# Patient Record
Sex: MALE | Race: WHITE | NOT HISPANIC OR LATINO | Employment: FULL TIME | URBAN - METROPOLITAN AREA
[De-identification: names, ages, dates, MRNs, and addresses within clinical notes are randomized per-mention and may not be internally consistent; named-entity substitution may affect disease eponyms.]

---

## 2018-10-10 DIAGNOSIS — Z00.00 ROUTINE GENERAL MEDICAL EXAMINATION AT A HEALTH CARE FACILITY: Primary | ICD-10-CM

## 2018-11-13 ENCOUNTER — HOSPITAL ENCOUNTER (OUTPATIENT)
Dept: CARDIOLOGY | Facility: CLINIC | Age: 38
Discharge: HOME | End: 2018-11-13
Payer: COMMERCIAL

## 2018-11-13 ENCOUNTER — OFFICE VISIT (OUTPATIENT)
Dept: PULMONOLOGY | Facility: CLINIC | Age: 38
End: 2018-11-13
Payer: COMMERCIAL

## 2018-11-13 ENCOUNTER — CLINICAL SUPPORT (OUTPATIENT)
Dept: INTERNAL MEDICINE | Facility: CLINIC | Age: 38
End: 2018-11-13

## 2018-11-13 ENCOUNTER — HOSPITAL ENCOUNTER (OUTPATIENT)
Dept: RADIOLOGY | Facility: HOSPITAL | Age: 38
Discharge: HOME | End: 2018-11-13
Attending: INTERNAL MEDICINE
Payer: COMMERCIAL

## 2018-11-13 ENCOUNTER — CLINICAL SUPPORT (OUTPATIENT)
Dept: INTERNAL MEDICINE | Facility: CLINIC | Age: 38
End: 2018-11-13
Payer: COMMERCIAL

## 2018-11-13 VITALS
SYSTOLIC BLOOD PRESSURE: 142 MMHG | HEIGHT: 69 IN | WEIGHT: 245 LBS | DIASTOLIC BLOOD PRESSURE: 82 MMHG | HEART RATE: 77 BPM | BODY MASS INDEX: 36.29 KG/M2

## 2018-11-13 DIAGNOSIS — Z00.00 ROUTINE GENERAL MEDICAL EXAMINATION AT A HEALTH CARE FACILITY: Primary | ICD-10-CM

## 2018-11-13 DIAGNOSIS — Z00.00 ROUTINE GENERAL MEDICAL EXAMINATION AT A HEALTH CARE FACILITY: ICD-10-CM

## 2018-11-13 DIAGNOSIS — Z00.00 ANNUAL PHYSICAL EXAM: Primary | ICD-10-CM

## 2018-11-13 LAB
ALBUMIN SERPL BCP-MCNC: 4.2 G/DL
ALP SERPL-CCNC: 59 U/L
ALT SERPL W/O P-5'-P-CCNC: 38 U/L
ANION GAP SERPL CALC-SCNC: 8 MMOL/L
AST SERPL-CCNC: 23 U/L
BILIRUB SERPL-MCNC: 0.8 MG/DL
BUN SERPL-MCNC: 17 MG/DL
CALCIUM SERPL-MCNC: 9.6 MG/DL
CHLORIDE SERPL-SCNC: 105 MMOL/L
CHOLEST SERPL-MCNC: 183 MG/DL
CHOLEST/HDLC SERPL: 2.9 {RATIO}
CO2 SERPL-SCNC: 27 MMOL/L
CREAT SERPL-MCNC: 1 MG/DL
ERYTHROCYTE [DISTWIDTH] IN BLOOD BY AUTOMATED COUNT: 11.9 %
EST. GFR  (AFRICAN AMERICAN): >60 ML/MIN/1.73 M^2
EST. GFR  (NON AFRICAN AMERICAN): >60 ML/MIN/1.73 M^2
ESTIMATED AVG GLUCOSE: 108 MG/DL
GLUCOSE SERPL-MCNC: 117 MG/DL
HBA1C MFR BLD HPLC: 5.4 %
HCT VFR BLD AUTO: 45.3 %
HDLC SERPL-MCNC: 63 MG/DL
HDLC SERPL: 34.4 %
HGB BLD-MCNC: 15.3 G/DL
LDLC SERPL CALC-MCNC: 105.4 MG/DL
MCH RBC QN AUTO: 29.8 PG
MCHC RBC AUTO-ENTMCNC: 33.8 G/DL
MCV RBC AUTO: 88 FL
NONHDLC SERPL-MCNC: 120 MG/DL
PLATELET # BLD AUTO: 249 K/UL
PMV BLD AUTO: 10.6 FL
POTASSIUM SERPL-SCNC: 4.3 MMOL/L
PROT SERPL-MCNC: 7.5 G/DL
RBC # BLD AUTO: 5.14 M/UL
SODIUM SERPL-SCNC: 140 MMOL/L
TRIGL SERPL-MCNC: 73 MG/DL
WBC # BLD AUTO: 6.34 K/UL

## 2018-11-13 PROCEDURE — 99385 PREV VISIT NEW AGE 18-39: CPT | Mod: S$GLB,,, | Performed by: INTERNAL MEDICINE

## 2018-11-13 PROCEDURE — 97802 MEDICAL NUTRITION INDIV IN: CPT | Mod: S$GLB,,, | Performed by: INTERNAL MEDICINE

## 2018-11-13 PROCEDURE — 80053 COMPREHEN METABOLIC PANEL: CPT

## 2018-11-13 PROCEDURE — 85027 COMPLETE CBC AUTOMATED: CPT

## 2018-11-13 PROCEDURE — 71046 X-RAY EXAM CHEST 2 VIEWS: CPT | Mod: TC,FY

## 2018-11-13 PROCEDURE — 80061 LIPID PANEL: CPT

## 2018-11-13 PROCEDURE — 97750 PHYSICAL PERFORMANCE TEST: CPT | Mod: S$GLB,,, | Performed by: INTERNAL MEDICINE

## 2018-11-13 PROCEDURE — 83036 HEMOGLOBIN GLYCOSYLATED A1C: CPT

## 2018-11-13 PROCEDURE — 93005 ELECTROCARDIOGRAM TRACING: CPT | Mod: S$GLB,,, | Performed by: INTERNAL MEDICINE

## 2018-11-13 PROCEDURE — 99999 PR PBB SHADOW E&M-EST. PATIENT-LVL III: CPT | Mod: PBBFAC,,, | Performed by: INTERNAL MEDICINE

## 2018-11-13 PROCEDURE — 36415 COLL VENOUS BLD VENIPUNCTURE: CPT

## 2018-11-13 PROCEDURE — 93010 ELECTROCARDIOGRAM REPORT: CPT | Mod: S$GLB,,, | Performed by: INTERNAL MEDICINE

## 2018-11-13 PROCEDURE — 71046 X-RAY EXAM CHEST 2 VIEWS: CPT | Mod: 26,,, | Performed by: RADIOLOGY

## 2018-11-13 NOTE — PROGRESS NOTES
"Nutrition Assessment  Client name:  Eriberto Palomino  :  1980  Age:  37 y.o.  Gender:  male    Client states:  Very pleasant employee of [employer] here for his initial Executive Health physical.  Has not had a physical in nearly five years.  Is  with two young children, ages 4 (son) and 5 (daughter).  Has an unremarkable PMH with the exception of pneumonia () and lower back pain.  Admits that his eating habits are poor, recalling the necessity of weight loss.  Struggles with portion control and beef intake, expressing desire to improve both.  Review of food history revealed skipped meals, increased dining out frequency, and poor food choices.  Also, does not exercise at this time although did in the past, completing body building exercises along with adhering to a strict dietary regimen.  Does not wish to pursue such stringent behaviors in the future due to probable noncompliance.  Shares that his father in law passed away this past August at age 67 due to CVD, and since then, he and his wife wish to improve their lifestyle habits.  His wife believes she "corrupted" him after getting  as they now dine out more regularly, noting that they have only cooked twice since his father in law's death.  Adds that he has also been drinking more beer than he should and prefers high calorie beers, such as Oktoberfest, craft beer, Heineken, etc.  Overall, realizes the potential health consequences of his current behaviors and so, desires to improve them in an attempt to lose weight and feel better, physically and mentally.       Anthropometrics  Height:  5' 8.5"     Weight:  247#  BMI:  37  % Body Fat:  28.73%    Clinical Signs/Symptoms  N/V/D:  None  Appetite (Good, Fair, or Poor):  Good      No past medical history on file.    No past surgical history on file.    Medications    currently has no medications in their medication list.    Vitamins, Minerals, and/or Supplements:  None     Food " Allergies or Intolerances:  NKFA     Social History    Marital status:    Employment:  [employer]    Social History     Tobacco Use    Smoking status: Former Smoker     Packs/day: 1.00     Years: 5.00     Pack years: 5.00     Types: Cigarettes     Last attempt to quit: 2004     Years since quittin.5    Smokeless tobacco: Never Used   Substance Use Topics    Alcohol use: Yes     Alcohol/week: 4.8 oz     Types: 8 Cans of beer per week        Lab Reports   Total Cholesterol:  183    Triglycerides:  73  HDL:  63  LDL:  105.4   Glucose:  117  HbA1c:  5.4%  BP:  142/82     Food History  Breakfast:  Coffee +/- 1-2 packets of instant grits or oatmeal  Mid-morning Snack:  None  Lunch:  Restaurant meal, such as a hamburger and fries and water, unsweet tea, or Diet Coke  Mid-afternoon Snack:  None  Dinner:  2 beignets + hot dog with bun + water  H.S. Snack:  None  *Fluid intake:  Water, unsweet tea, Diet Coke (2-3/week), beer (~12/week)    Exercise History:  None    Cultural/Spiritual/Personal Preferences:  None identified    Support System:  Family    State of Change:  Contemplation    Barriers to Change:  Time constraints    Diagnosis    Obesity related to excessive energy intake, improper food choices, and inadequate physical activity as evidenced by BMI:  37.    Intervention    RMR (Method:  Body [place]):  1820 kcal  Activity Factor:  1.3  ROWENA:  2366 - 500 = 1866 kcal    Goals:  1.  Achieve 10% (24#) weight loss by 2019 (Goal weight:  223#)  2.  Begin exercising weekly as directed by EP  3.  Begin tracking intake via My Fitness Pal david, aiming for 1850 kcal daily  4.  Reduce intake of beef, replacing with fresh fish, preferably 2x/week  5.  Incorporate a ½ plate of non-starchy vegetables with lunch and dinner  6.  Incorporate 1 fruit daily  7.  Reduce dining out frequency  8.  Reduce alcohol intake, incorporating water in between each drink    Nutrition Education  Reviewed CMP, lipid  panel, and HbA1c, noting borderline high fasting glucose, however accompanied by optimal HbA1c.  Explained such to patient, stressing the importance of improved lifestyle behaviors and a healthy body weight.  Discussed the benefits of routine self-monitoring, advising patient to consider tracking intake via My Fitness Pal, aiming for 1850 kcal daily based on REE.  Supported patient's goal in reduced beef intake and increased fish intake, encouraging fish 2x/week per AHA.  Reviewed the benefits of adequate fiber intake, particularly in relation to weight loss, advising inclusion of added non-starchy vegetables and fruit daily.  Conversely, discussed the potential health consequences of frequent dining out and excessive alcohol intake, encouraging simultaneous reduction.  Provided patient with tips for alcohol reduction, advising selection of lite beer and increased water intake.  In addition, discussed weight loss, including short and long term weight loss goals, benefits of 5-10% weight loss, and ways to achieve via lifestyle modifications in lieu of fad diets.  Overall, although patient expresses knowledge regarding the necessity and benefits of lifestyle modifications, appears in the Contemplation Phase of Change.     Patient verbalized understanding of nutrition education and recommendations received.    Handouts Provided  Meal Planning Guide  Restaurant Guide  Eat Fit Shopping List  Eat Fit [place]  Fast Food Guide  Vitamin/Mineral Guide  Individual Nutrition Notes    Monitoring/Evaluation    Monitor the following:  Weight  BMI  % Body Fat  Caloric intake  Glucose    Follow Up Plan:  Communication with referring healthcare provider is unnecessary at this time as patient presented as part of annual wellness exam.  However, will follow up with patient in 1-2 years.

## 2018-11-13 NOTE — LETTER
November 13, 2018    Eriberto Palomino  [address] [address]             [facility]  [address]  [place] [address]  Phone: [phone] Dear  Eriberto,       Thank you for allowing me to serve you and perform your Executive Health exam on 11/13/2018. This letter will serve as a brief summary of the physical findings and laboratory/studies performed and recommendations at this time.  Except for a mild elevation of the blood glucose, this is a normal exam. This will resolve with a 10 to 15 pound weight loss.         If you have any questions or concerns, please don't hesitate to call.    Sincerely,        Fortino Song MD

## 2018-11-13 NOTE — PROGRESS NOTES
Subjective:       Patient ID: Eriberto Palomino is a 37 y.o. male.    Chief Complaint: No chief complaint on file.    HPI   Pt. Has no significant cardiovascular or pulmonary history.    Physical Limitations: None    Current exercise routine:  Patient does not follow any formal exercise or flexibility routine at the current time.    Goals: Pt. Set a year goal weight of 210 lbs and a long term goal weight of 195 lbs.  We discussed that he does not need to get under a BMI of 25 as long as he is maintaining lean mass and decreasing fat mass.  We calculated his long term goal of 195 lbs to get him under a BMI of 30.    Fun Facts: Pt. Was very friendly and engaged.  He works at a [employer].  Pt. Stated that his father in law recently passed away from heart disease and had told him and his wife before passing that they need to get into shape and take care of themselves and the health of their 3 children.  Pt. Seemed motivated to make a change.  Pt. Was receptive to all recommendations made.      Review of Systems    Objective:     The fitness evaluation results are as follows:  D.O.S. 11/13/2018   Height (in): 68.5   Weight (lbs): 247   BMI: 37.399164   Body Fat (%): 28.73   Waist (cm): 109   Hip (cm): 122   WHR: 0.89   RBP (mmHg): 116/84   RHR (bpm): 74    Strength R (lbs)t: 135    Strength Lt (lbs): 146.20657   Push-up Assessment: 45   Curl-up Assessment: 0   Flexibility Testing (cm): 29   REE (kcals): 1820       Physical Exam    Assessment:     Age/gender stratified assessment:  Resting BP: Within Normal Limits   Body Fat %: poor   WHR Risk Factor: low risk    Strength R: average    Strength L: above average   Upper Body Endurance: excellent   Abdominal Endurance: well below average   Lower body Flexibiltiy: good       1. Routine general medical examination at a health care facility        Plan:       Recommended fitness guidelines:    -150 minutes of moderate intensity aerobic exercise per week  or 75 minutes of vigorous intensity aerobic exercise per week.  Try to reach a minimum of 150 minutes of moderate intensity aerobic activity per week and 10,000 steps per day.     -2 to 4 days per week of resistance training for each muscle group.  Start to incorporate the upper and lower body resistance training program along with the core stabilization program given during the evaluation.      -Daily stretching with a hold of at least 30 seconds per muscle group.  Practice the seated hamstring and piriformis stretches, demonstrated during he evaluation, daily.

## 2018-11-13 NOTE — PROGRESS NOTES
Subjective:       Patient ID: Eriberto Palomino is a 37 y.o. male.    Chief Complaint: Annual Exam    HPI 38 yo employee of [employer], runs the [employer]. I saw him as a patient in 2014 for possible pneumonia. No medical contacts since that visit. He takes no medications and has never been hospitalized.   Review of Systems   Constitutional: Negative.         Obese: BMI:36   HENT: Negative.    Eyes: Negative.    Respiratory: Negative.         Possible mild case of viral pneumonia in the left lung in 2014   Cardiovascular: Negative.    Gastrointestinal: Negative.    Genitourinary: Negative.    Musculoskeletal: Negative.    Skin: Negative.    Neurological: Negative.    Psychiatric/Behavioral: Negative.    All other systems reviewed and are negative.      Objective:      Physical Exam   Constitutional: He is oriented to person, place, and time. He appears well-developed and well-nourished.   HENT:   Head: Normocephalic and atraumatic.   Right Ear: External ear normal.   Left Ear: External ear normal.   Eyes: Conjunctivae and EOM are normal. Pupils are equal, round, and reactive to light.   Neck: Normal range of motion. Neck supple.   Cardiovascular: Normal rate, regular rhythm and normal heart sounds.   Pulmonary/Chest: Effort normal and breath sounds normal.   Peak flow 650 l/min   Abdominal: Soft. Bowel sounds are normal.   Musculoskeletal: Normal range of motion.   Neurological: He is alert and oriented to person, place, and time. He has normal reflexes.   Skin: Skin is warm and dry.   Psychiatric: He has a normal mood and affect. His behavior is normal. Judgment and thought content normal.       Assessment:       1. Annual physical exam        Plan:           Labs: Glucose: 117,  improve with diet modification and weight loss. Chest x-ray is clear and EKG is normal. IMP: Healthy but overweight male.

## 2019-02-19 ENCOUNTER — OFFICE VISIT (OUTPATIENT)
Dept: URGENT CARE | Facility: CLINIC | Age: 39
End: 2019-02-19
Payer: COMMERCIAL

## 2019-02-19 VITALS
RESPIRATION RATE: 18 BRPM | DIASTOLIC BLOOD PRESSURE: 88 MMHG | TEMPERATURE: 100 F | BODY MASS INDEX: 34.36 KG/M2 | SYSTOLIC BLOOD PRESSURE: 129 MMHG | HEART RATE: 82 BPM | WEIGHT: 240 LBS | OXYGEN SATURATION: 99 % | HEIGHT: 70 IN

## 2019-02-19 DIAGNOSIS — Z20.828 EXPOSURE TO THE FLU: ICD-10-CM

## 2019-02-19 DIAGNOSIS — J06.9 UPPER RESPIRATORY TRACT INFECTION, UNSPECIFIED TYPE: Primary | ICD-10-CM

## 2019-02-19 LAB
CTP QC/QA: YES
FLUAV AG NPH QL: NEGATIVE
FLUBV AG NPH QL: NEGATIVE

## 2019-02-19 PROCEDURE — 99203 PR OFFICE/OUTPT VISIT, NEW, LEVL III, 30-44 MIN: ICD-10-PCS | Mod: S$GLB,,, | Performed by: PHYSICIAN ASSISTANT

## 2019-02-19 PROCEDURE — 3008F PR BODY MASS INDEX (BMI) DOCUMENTED: ICD-10-PCS | Mod: CPTII,S$GLB,, | Performed by: PHYSICIAN ASSISTANT

## 2019-02-19 PROCEDURE — 99203 OFFICE O/P NEW LOW 30 MIN: CPT | Mod: S$GLB,,, | Performed by: PHYSICIAN ASSISTANT

## 2019-02-19 PROCEDURE — 87804 POCT INFLUENZA A/B: ICD-10-PCS | Mod: 59,QW,S$GLB, | Performed by: PHYSICIAN ASSISTANT

## 2019-02-19 PROCEDURE — 3008F BODY MASS INDEX DOCD: CPT | Mod: CPTII,S$GLB,, | Performed by: PHYSICIAN ASSISTANT

## 2019-02-19 PROCEDURE — 87804 INFLUENZA ASSAY W/OPTIC: CPT | Mod: QW,S$GLB,, | Performed by: PHYSICIAN ASSISTANT

## 2019-02-19 NOTE — PROGRESS NOTES
"Subjective:       Patient ID: Eriberto Palomino is a 38 y.o. male.    Vitals:  height is 5' 9.5" (1.765 m) and weight is 108.9 kg (240 lb). His oral temperature is 100.3 °F (37.9 °C). His blood pressure is 129/88 and his pulse is 82. His respiration is 18 and oxygen saturation is 99%.     Chief Complaint: URI    This is a 38 y.o. male who presents today with a chief complaint of sinus congestion, scratchy throat, subjective fever. Taking Dayquil. Wife diagnosed with Viral Upper Respiratory Infection recently.      URI    This is a new problem. The current episode started in the past 7 days. The problem has been gradually worsening. Associated symptoms include congestion, coughing, diarrhea, sinus pain and a sore throat. Pertinent negatives include no chest pain, ear pain, headaches, nausea, neck pain, plugged ear sensation, rhinorrhea, sneezing, vomiting or wheezing. He has tried acetaminophen for the symptoms. The treatment provided significant relief.       Constitution: Positive for fatigue and fever.   HENT: Positive for congestion, sinus pain and sore throat. Negative for ear pain.    Neck: Negative for neck pain.   Cardiovascular: Negative for chest pain.   Respiratory: Positive for cough and sputum production. Negative for wheezing and asthma.    Gastrointestinal: Positive for diarrhea. Negative for nausea and vomiting.   Musculoskeletal: Negative for pain.   Skin: Negative for erythema.   Allergic/Immunologic: Negative for seasonal allergies, asthma, sneezing and flu shot.   Neurological: Negative for headaches and altered mental status.   Psychiatric/Behavioral: Negative for altered mental status.       Objective:      Physical Exam   Constitutional: He is oriented to person, place, and time. He appears well-developed and well-nourished. No distress.   HENT:   Head: Normocephalic and atraumatic.   Right Ear: Hearing, tympanic membrane, external ear and ear canal normal.   Left Ear: Hearing, tympanic membrane, " external ear and ear canal normal.   Nose: Mucosal edema and rhinorrhea present. Right sinus exhibits no maxillary sinus tenderness and no frontal sinus tenderness. Left sinus exhibits no maxillary sinus tenderness and no frontal sinus tenderness.   Mouth/Throat: Uvula is midline and oropharynx is clear and moist.   Eyes: Conjunctivae are normal.   Neck: Normal range of motion. Neck supple.   Cardiovascular: Normal rate and regular rhythm. Exam reveals no gallop and no friction rub.   No murmur heard.  Pulmonary/Chest: Effort normal and breath sounds normal. He has no wheezes. He has no rales.   Musculoskeletal: Normal range of motion.   Neurological: He is alert and oriented to person, place, and time.   Skin: Skin is warm and dry. No rash noted. No erythema.   Psychiatric: He has a normal mood and affect. His behavior is normal. Judgment and thought content normal.   Nursing note and vitals reviewed.      Results for orders placed or performed in visit on 02/19/19   POCT Influenza A/B   Result Value Ref Range    Rapid Influenza A Ag Negative Negative    Rapid Influenza B Ag Negative Negative     Acceptable Yes      Assessment:       1. Upper respiratory tract infection, unspecified type    2. Exposure to the flu        Plan:         Upper respiratory tract infection, unspecified type    Exposure to the flu  -     POCT Influenza A/B        Eriberto was seen today for uri.    Diagnoses and all orders for this visit:    Upper respiratory tract infection, unspecified type    Exposure to the flu  -     POCT Influenza A/B      Patient Instructions   - Rest.    - Drink plenty of fluids.    - Tylenol or Ibuprofen as directed as needed for fever/pain.    - Follow up with your PCP or specialty clinic as directed in the next 1-2 weeks if not improved or as needed.  You can call [phone] to schedule an appointment with the appropriate provider.    - Go to the ED if your symptoms worsen.  - You must  understand that you have received an Urgent Care treatment only and that you may be released before all of your medical problems are known or treated.   - You, the patient, will arrange for follow up care as instructed.   - If your condition worsens or fails to improve we recommend that you receive another evaluation at the ER immediately or contact your PCP to discuss your concerns or return here.       Viral Upper Respiratory Illness (Adult)  You have a viral upper respiratory illness (URI), which is another term for the common cold. This illness is contagious during the first few days. It is spread through the air by coughing and sneezing. It may also be spread by direct contact (touching the sick person and then touching your own eyes, nose, or mouth). Frequent handwashing will decrease risk of spread. Most viral illnesses go away within 7 to 10 days with rest and simple home remedies. Sometimes the illness may last for several weeks. Antibiotics will not kill a virus, and they are generally not prescribed for this condition.    Home care  · If symptoms are severe, rest at home for the first 2 to 3 days. When you resume activity, don't let yourself get too tired.  · Avoid being exposed to cigarette smoke (yours or others).  · You may use acetaminophen or ibuprofen to control pain and fever, unless another medicine was prescribed. (Note: If you have chronic liver or kidney disease, have ever had a stomach ulcer or gastrointestinal bleeding, or are taking blood-thinning medicines, talk with your healthcare provider before using these medicines.) Aspirin should never be given to anyone under 18 years of age who is ill with a viral infection or fever. It may cause severe liver or brain damage.  · Your appetite may be poor, so a light diet is fine. Avoid dehydration by drinking 6 to 8 glasses of fluids per day (water, soft drinks, juices, tea, or soup). Extra fluids will help loosen secretions in the nose and  lungs.  · Over-the-counter cold medicines will not shorten the length of time youre sick, but they may be helpful for the following symptoms: cough, sore throat, and nasal and sinus congestion. (Note: Do not use decongestants if you have high blood pressure.)  Follow-up care  Follow up with your healthcare provider, or as advised.  When to seek medical advice  Call your healthcare provider right away if any of these occur:  · Cough with lots of colored sputum (mucus)  · Severe headache; face, neck, or ear pain  · Difficulty swallowing due to throat pain  · Fever of 100.4°F (38°C)  Call 911, or get immediate medical care  Call emergency services right away if any of these occur:  · Chest pain, shortness of breath, wheezing, or difficulty breathing  · Coughing up blood  · Inability to swallow due to throat pain  Date Last Reviewed: 9/13/2015  © 6572-8618 [employer]. [address] [place] [address], [place] [address]. All rights reserved. This information is not intended as a substitute for professional medical care. Always follow your healthcare professional's instructions.

## 2019-02-20 NOTE — PATIENT INSTRUCTIONS
- Rest.    - Drink plenty of fluids.    - Tylenol or Ibuprofen as directed as needed for fever/pain.    - Follow up with your PCP or specialty clinic as directed in the next 1-2 weeks if not improved or as needed.  You can call [phone] to schedule an appointment with the appropriate provider.    - Go to the ED if your symptoms worsen.  - You must understand that you have received an Urgent Care treatment only and that you may be released before all of your medical problems are known or treated.   - You, the patient, will arrange for follow up care as instructed.   - If your condition worsens or fails to improve we recommend that you receive another evaluation at the ER immediately or contact your PCP to discuss your concerns or return here.       Viral Upper Respiratory Illness (Adult)  You have a viral upper respiratory illness (URI), which is another term for the common cold. This illness is contagious during the first few days. It is spread through the air by coughing and sneezing. It may also be spread by direct contact (touching the sick person and then touching your own eyes, nose, or mouth). Frequent handwashing will decrease risk of spread. Most viral illnesses go away within 7 to 10 days with rest and simple home remedies. Sometimes the illness may last for several weeks. Antibiotics will not kill a virus, and they are generally not prescribed for this condition.    Home care  · If symptoms are severe, rest at home for the first 2 to 3 days. When you resume activity, don't let yourself get too tired.  · Avoid being exposed to cigarette smoke (yours or others).  · You may use acetaminophen or ibuprofen to control pain and fever, unless another medicine was prescribed. (Note: If you have chronic liver or kidney disease, have ever had a stomach ulcer or gastrointestinal bleeding, or are taking blood-thinning medicines, talk with your healthcare provider before using these medicines.) Aspirin should  never be given to anyone under 18 years of age who is ill with a viral infection or fever. It may cause severe liver or brain damage.  · Your appetite may be poor, so a light diet is fine. Avoid dehydration by drinking 6 to 8 glasses of fluids per day (water, soft drinks, juices, tea, or soup). Extra fluids will help loosen secretions in the nose and lungs.  · Over-the-counter cold medicines will not shorten the length of time youre sick, but they may be helpful for the following symptoms: cough, sore throat, and nasal and sinus congestion. (Note: Do not use decongestants if you have high blood pressure.)  Follow-up care  Follow up with your healthcare provider, or as advised.  When to seek medical advice  Call your healthcare provider right away if any of these occur:  · Cough with lots of colored sputum (mucus)  · Severe headache; face, neck, or ear pain  · Difficulty swallowing due to throat pain  · Fever of 100.4°F (38°C)  Call 911, or get immediate medical care  Call emergency services right away if any of these occur:  · Chest pain, shortness of breath, wheezing, or difficulty breathing  · Coughing up blood  · Inability to swallow due to throat pain  Date Last Reviewed: 9/13/2015  © 6145-5608 [employer]. [address] [place] [address], [place] [address]. All rights reserved. This information is not intended as a substitute for professional medical care. Always follow your healthcare professional's instructions.

## 2019-02-22 ENCOUNTER — OFFICE VISIT (OUTPATIENT)
Dept: FAMILY MEDICINE | Facility: CLINIC | Age: 39
End: 2019-02-22
Payer: COMMERCIAL

## 2019-02-22 VITALS
HEIGHT: 70 IN | SYSTOLIC BLOOD PRESSURE: 136 MMHG | HEART RATE: 83 BPM | OXYGEN SATURATION: 97 % | DIASTOLIC BLOOD PRESSURE: 87 MMHG | RESPIRATION RATE: 16 BRPM | TEMPERATURE: 99 F | WEIGHT: 240.5 LBS | BODY MASS INDEX: 34.43 KG/M2

## 2019-02-22 DIAGNOSIS — R09.82 PND (POST-NASAL DRIP): ICD-10-CM

## 2019-02-22 DIAGNOSIS — J06.9 UPPER RESPIRATORY TRACT INFECTION, UNSPECIFIED TYPE: Primary | ICD-10-CM

## 2019-02-22 DIAGNOSIS — Z23 NEED FOR INFLUENZA VACCINATION: ICD-10-CM

## 2019-02-22 PROCEDURE — 90686 FLU VACCINE (QUAD) GREATER THAN OR EQUAL TO 3YO PRESERVATIVE FREE IM: ICD-10-PCS | Mod: S$GLB,,, | Performed by: FAMILY MEDICINE

## 2019-02-22 PROCEDURE — 99999 PR PBB SHADOW E&M-EST. PATIENT-LVL III: ICD-10-PCS | Mod: PBBFAC,,, | Performed by: FAMILY MEDICINE

## 2019-02-22 PROCEDURE — 99202 OFFICE O/P NEW SF 15 MIN: CPT | Mod: 25,S$GLB,, | Performed by: FAMILY MEDICINE

## 2019-02-22 PROCEDURE — 99202 PR OFFICE/OUTPT VISIT, NEW, LEVL II, 15-29 MIN: ICD-10-PCS | Mod: 25,S$GLB,, | Performed by: FAMILY MEDICINE

## 2019-02-22 PROCEDURE — 90686 IIV4 VACC NO PRSV 0.5 ML IM: CPT | Mod: S$GLB,,, | Performed by: FAMILY MEDICINE

## 2019-02-22 PROCEDURE — 90471 FLU VACCINE (QUAD) GREATER THAN OR EQUAL TO 3YO PRESERVATIVE FREE IM: ICD-10-PCS | Mod: S$GLB,,, | Performed by: FAMILY MEDICINE

## 2019-02-22 PROCEDURE — 99999 PR PBB SHADOW E&M-EST. PATIENT-LVL III: CPT | Mod: PBBFAC,,, | Performed by: FAMILY MEDICINE

## 2019-02-22 PROCEDURE — 3008F PR BODY MASS INDEX (BMI) DOCUMENTED: ICD-10-PCS | Mod: CPTII,S$GLB,, | Performed by: FAMILY MEDICINE

## 2019-02-22 PROCEDURE — 3008F BODY MASS INDEX DOCD: CPT | Mod: CPTII,S$GLB,, | Performed by: FAMILY MEDICINE

## 2019-02-22 PROCEDURE — 90471 IMMUNIZATION ADMIN: CPT | Mod: S$GLB,,, | Performed by: FAMILY MEDICINE

## 2019-02-22 NOTE — LETTER
February 22, 2019      [facility]  [address]  [place] [address]  Phone: [phone]  Fax: [phone]       Patient: Eriberto Palomino   YOB: 1980  Date of Visit: 02/22/2019    To Whom It May Concern:    Vasile Palomino  was at [facility] on 02/22/2019. He may return to work/school on 02/25/2019 with no restrictions. If you have any questions or concerns, or if I can be of further assistance, please do not hesitate to contact me.    Sincerely,    oLlly Dewey LPN

## 2019-02-22 NOTE — PROGRESS NOTES
Office Visit    Patient Name: Eriberto Palomino    : 1980  MRN: [id number]      Assessment/Plan:  Eriberto Palomino is a 38 y.o. male who presents today for :    Upper respiratory tract infection, unspecified type    PND (post-nasal drip)    Need for influenza vaccination  -     Influenza - Quadrivalent (3 years & older) (PF)      -AFVSS - appears to be improving. Exam unremarkable, reassurance provided as his temp can fluctuate during an acute viral illness and advised supportive care for now.   -Cepacol prn for sore throat. Claritin PRN for drainage  -advised pt that cough may last up to 2-3 weeks  -advised frequent hand washing, rest, and plenty of fluids.     Follow-up for worsening Sx. Urgent care/ED precautions provided.     This note was created by combination of typed  and Dragon dictation.  Transcription errors may be present.  If there are any questions, please contact me.      ----------------------------------------------------------------------------------------------------------------------      HPI:  Patient Care Team:  Naveen Gonzalez MD as PCP - General (Family Medicine)    Eriberto is a 38 y.o. male with    no significant medical history  This patient is new to me    Patient presents today for :  uri follow up  and sinus congestion for the past week. He went to urgent care 3 days ago and was diagnosed with URI and told to pursue conservative treatment. He tested negative for the flu. For the past 2-3 days, patient states his temp has been running , and he has been taking Dayquil as needed. He states he feels better overall but wants to get a follow up checkup today to make sure he's doing ok. He has occasional mild cough with clear phlegm. He denies  facial pressure and pain. He has normal appetite.  No sore throat.  No body aches.  No ear pain.  No chills.     Additional ROS    No dysphagia  No CP/SOB/palpitations/swelling  No nausea/vomiting/abd pain/no diarrhea  No  "rashes      There is no problem list on file for this patient.      Current Medications  Medications reviewed and updated.     No current outpatient medications on file.    Past Surgical History:   Procedure Laterality Date    VASECTOMY         Family History   Problem Relation Age of Onset    Hypertension Father     Emphysema Paternal Grandmother        Social History     Socioeconomic History    Marital status:      Spouse name: Not on file    Number of children: Not on file    Years of education: Not on file    Highest education level: Not on file   Social Needs    Financial resource strain: Not on file    Food insecurity - worry: Not on file    Food insecurity - inability: Not on file    Transportation needs - medical: Not on file    Transportation needs - non-medical: Not on file   Occupational History    Occupation:  for [employer]   Tobacco Use    Smoking status: Former Smoker     Packs/day: 1.00     Years: 5.00     Pack years: 5.00     Types: Cigarettes     Last attempt to quit: 2004     Years since quittin.8    Smokeless tobacco: Never Used   Substance and Sexual Activity    Alcohol use: Yes     Alcohol/week: 4.8 oz     Types: 8 Cans of beer per week    Drug use: No    Sexual activity: Yes     Partners: Female   Other Topics Concern    Not on file   Social History Narrative    Not on file             Allergies   Review of patient's allergies indicates:  No Known Allergies          Review of Systems  See HPI      Physical Exam  /87 (BP Location: Left arm, Patient Position: Sitting, BP Method: Large (Manual))   Pulse 83   Temp 98.8 °F (37.1 °C) (Oral)   Resp 16   Ht 5' 9.5" (1.765 m)   Wt 109.1 kg (240 lb 8.4 oz)   SpO2 97%   BMI 35.01 kg/m²     GEN: NAD, well developed  HEENT: NCAT, PERRLA, EOMI, sclera clear, anicteric, TM clear bilaterally with normal light reflex, mild nasal turbinate swelling, MMM with no lesions, O/P clear - no " tonsillar swelling/discharge, +mild drainage, +minimal clear nasal discharge, no frontal/maxillary TTP, No trismus/uvula deviation  NECK: normal, supple with midline trachea, no LAD, no thyromegaly  LUNGS: CTAB, no w/r/r, no increased work of breathing  HEART: RRR, normal S1 and S2, no m/r/g  ABD: s/nt/nd, NABS  SKIN: normal turgor, no rashes, no other lesions.   PSYCH: AOx3, appropriate mood and affect

## 2019-10-23 DIAGNOSIS — Z00.00 ROUTINE GENERAL MEDICAL EXAMINATION AT A HEALTH CARE FACILITY: Primary | ICD-10-CM

## 2019-11-15 ENCOUNTER — PATIENT OUTREACH (OUTPATIENT)
Dept: ADMINISTRATIVE | Facility: OTHER | Age: 39
End: 2019-11-15

## 2019-11-19 ENCOUNTER — CLINICAL SUPPORT (OUTPATIENT)
Dept: INTERNAL MEDICINE | Facility: CLINIC | Age: 39
End: 2019-11-19

## 2019-11-19 ENCOUNTER — HOSPITAL ENCOUNTER (OUTPATIENT)
Dept: RADIOLOGY | Facility: HOSPITAL | Age: 39
Discharge: HOME | End: 2019-11-19
Attending: INTERNAL MEDICINE

## 2019-11-19 ENCOUNTER — OFFICE VISIT (OUTPATIENT)
Dept: PULMONOLOGY | Facility: CLINIC | Age: 39
End: 2019-11-19

## 2019-11-19 ENCOUNTER — HOSPITAL ENCOUNTER (OUTPATIENT)
Dept: CARDIOLOGY | Facility: CLINIC | Age: 39
Discharge: HOME | End: 2019-11-19

## 2019-11-19 VITALS
SYSTOLIC BLOOD PRESSURE: 138 MMHG | HEART RATE: 83 BPM | WEIGHT: 246 LBS | RESPIRATION RATE: 12 BRPM | BODY MASS INDEX: 36.43 KG/M2 | DIASTOLIC BLOOD PRESSURE: 87 MMHG | HEIGHT: 69 IN

## 2019-11-19 DIAGNOSIS — Z00.00 ROUTINE GENERAL MEDICAL EXAMINATION AT A HEALTH CARE FACILITY: ICD-10-CM

## 2019-11-19 DIAGNOSIS — Z00.00 ANNUAL PHYSICAL EXAM: Primary | ICD-10-CM

## 2019-11-19 DIAGNOSIS — Z00.00 ROUTINE GENERAL MEDICAL EXAMINATION AT A HEALTH CARE FACILITY: Primary | ICD-10-CM

## 2019-11-19 LAB
ALBUMIN SERPL BCP-MCNC: 4.2 G/DL (ref 3.5–5.2)
ALP SERPL-CCNC: 56 U/L (ref 55–135)
ALT SERPL W/O P-5'-P-CCNC: 39 U/L (ref 10–44)
ANION GAP SERPL CALC-SCNC: 7 MMOL/L (ref 8–16)
AST SERPL-CCNC: 24 U/L (ref 10–40)
BILIRUB SERPL-MCNC: 0.7 MG/DL (ref 0.1–1)
BUN SERPL-MCNC: 20 MG/DL (ref 6–20)
CALCIUM SERPL-MCNC: 9.5 MG/DL (ref 8.7–10.5)
CHLORIDE SERPL-SCNC: 104 MMOL/L (ref 95–110)
CHOLEST SERPL-MCNC: 189 MG/DL (ref 120–199)
CHOLEST/HDLC SERPL: 2.7 {RATIO} (ref 2–5)
CO2 SERPL-SCNC: 28 MMOL/L (ref 23–29)
CREAT SERPL-MCNC: 1.1 MG/DL (ref 0.5–1.4)
ERYTHROCYTE [DISTWIDTH] IN BLOOD BY AUTOMATED COUNT: 11.6 % (ref 11.5–14.5)
EST. GFR  (AFRICAN AMERICAN): >60 ML/MIN/1.73 M^2
EST. GFR  (NON AFRICAN AMERICAN): >60 ML/MIN/1.73 M^2
ESTIMATED AVG GLUCOSE: 114 MG/DL (ref 68–131)
GLUCOSE SERPL-MCNC: 124 MG/DL (ref 70–110)
HBA1C MFR BLD HPLC: 5.6 % (ref 4–5.6)
HCT VFR BLD AUTO: 46.2 % (ref 40–54)
HDLC SERPL-MCNC: 71 MG/DL (ref 40–75)
HDLC SERPL: 37.6 % (ref 20–50)
HGB BLD-MCNC: 15.2 G/DL (ref 14–18)
LDLC SERPL CALC-MCNC: 102.2 MG/DL (ref 63–159)
MCH RBC QN AUTO: 29.9 PG (ref 27–31)
MCHC RBC AUTO-ENTMCNC: 32.9 G/DL (ref 32–36)
MCV RBC AUTO: 91 FL (ref 82–98)
NONHDLC SERPL-MCNC: 118 MG/DL
PLATELET # BLD AUTO: 225 K/UL (ref 150–350)
PMV BLD AUTO: 10.6 FL (ref 9.2–12.9)
POTASSIUM SERPL-SCNC: 4.5 MMOL/L (ref 3.5–5.1)
PROT SERPL-MCNC: 7.5 G/DL (ref 6–8.4)
RBC # BLD AUTO: 5.09 M/UL (ref 4.6–6.2)
SODIUM SERPL-SCNC: 139 MMOL/L (ref 136–145)
TRIGL SERPL-MCNC: 79 MG/DL (ref 30–150)
TSH SERPL DL<=0.005 MIU/L-ACNC: 2.06 UIU/ML (ref 0.4–4)
WBC # BLD AUTO: 9.45 K/UL (ref 3.9–12.7)

## 2019-11-19 PROCEDURE — 97802 PR MED NUTR THER, 1ST, INDIV, EA 15 MIN: ICD-10-PCS | Mod: S$GLB,,, | Performed by: INTERNAL MEDICINE

## 2019-11-19 PROCEDURE — 93005 ELECTROCARDIOGRAM TRACING: CPT | Mod: S$GLB,,, | Performed by: INTERNAL MEDICINE

## 2019-11-19 PROCEDURE — 93010 ELECTROCARDIOGRAM REPORT: CPT | Mod: S$GLB,,, | Performed by: INTERNAL MEDICINE

## 2019-11-19 PROCEDURE — 97750 PR PHYSICAL PERFORMANCE TEST: ICD-10-PCS | Mod: S$GLB,,, | Performed by: INTERNAL MEDICINE

## 2019-11-19 PROCEDURE — 84443 ASSAY THYROID STIM HORMONE: CPT

## 2019-11-19 PROCEDURE — 97802 MEDICAL NUTRITION INDIV IN: CPT | Mod: S$GLB,,, | Performed by: INTERNAL MEDICINE

## 2019-11-19 PROCEDURE — 83036 HEMOGLOBIN GLYCOSYLATED A1C: CPT

## 2019-11-19 PROCEDURE — 71046 X-RAY EXAM CHEST 2 VIEWS: CPT | Mod: TC,FY

## 2019-11-19 PROCEDURE — 71046 XR CHEST PA AND LATERAL: ICD-10-PCS | Mod: 26,,, | Performed by: RADIOLOGY

## 2019-11-19 PROCEDURE — 99999 PR PBB SHADOW E&M-EST. PATIENT-LVL III: ICD-10-PCS | Mod: PBBFAC,,, | Performed by: INTERNAL MEDICINE

## 2019-11-19 PROCEDURE — 99999 PR PBB SHADOW E&M-EST. PATIENT-LVL III: CPT | Mod: PBBFAC,,, | Performed by: INTERNAL MEDICINE

## 2019-11-19 PROCEDURE — 80061 LIPID PANEL: CPT

## 2019-11-19 PROCEDURE — 99385 PREV VISIT NEW AGE 18-39: CPT | Mod: S$GLB,,, | Performed by: INTERNAL MEDICINE

## 2019-11-19 PROCEDURE — 97750 PHYSICAL PERFORMANCE TEST: CPT | Mod: S$GLB,,, | Performed by: INTERNAL MEDICINE

## 2019-11-19 PROCEDURE — 93005 EKG 12-LEAD: ICD-10-PCS | Mod: S$GLB,,, | Performed by: INTERNAL MEDICINE

## 2019-11-19 PROCEDURE — 93010 EKG 12-LEAD: ICD-10-PCS | Mod: S$GLB,,, | Performed by: INTERNAL MEDICINE

## 2019-11-19 PROCEDURE — 85027 COMPLETE CBC AUTOMATED: CPT

## 2019-11-19 PROCEDURE — 99385 PR PREVENTIVE VISIT,NEW,18-39: ICD-10-PCS | Mod: S$GLB,,, | Performed by: INTERNAL MEDICINE

## 2019-11-19 PROCEDURE — 71046 X-RAY EXAM CHEST 2 VIEWS: CPT | Mod: 26,,, | Performed by: RADIOLOGY

## 2019-11-19 PROCEDURE — 80053 COMPREHEN METABOLIC PANEL: CPT

## 2019-11-19 PROCEDURE — 36415 COLL VENOUS BLD VENIPUNCTURE: CPT

## 2019-11-19 NOTE — LETTER
November 19, 2019    Eriberto Palomino  [address] [address]             [facility]  [address]  [place] [address]  Phone: [phone] Dear Eriberto,        Thank you for allowing me to serve you and perform your [facility] exam on 11/19/2019. This letter will serve as a brief summary of the physical findings and laboratory/studies performed and recommendations at this time. Except for your weight, this is a normal exam. Your blood sugar is being to fredy up and you are a borderline diabetic.  I would strongly encouraged you to focusing on losing weight.          If you have any questions or concerns, please don't hesitate to call.    Sincerely,        Fortino Song MD

## 2019-11-19 NOTE — PROGRESS NOTES
"Nutrition Assessment  Client name:  Eriberto Palomino   (Annual  physical)  :  1980  Age:  38 y.o.  Gender:  male    Client states:  His goal at last visit in 2018 was to lose 24#, and shares that he lost 10#, however regained it due to several vacations. During this time, he was eating , drinking less beer, exercising and substituted unsweetened iced tea for diet coke.This August thru mid October, he was getting up at 4:30 am and going to the gym to exercise for 45 minutes to 1 hour. Had awareness that he was gaining strength, eating  and had lost 10#, however took a [place] vacation to [place], and regained the wt. When returning back home, thus far he has not been able to get back into his exercise routine, although he does have the desire to do so. Most lunches are eaten in restaurants and family dines out 3x/wk which he would like to reduce. After a long day at work, he will relax with 3 Heineken beers and drinks a 6 pack on the weekends with friends. His goal by next visit is to lose wt and return to his previous exercise routine.    Anthropometrics  Height:  5'8.5"     Weight:  249  BMI:  37.38  % Body Fat:  30.69    Clinical Signs/Symptoms  N/V/D:  none  Appetite (Good, Fair, or Poor):  good      No past medical history on file.    Past Surgical History:   Procedure Laterality Date    VASECTOMY         Medications    currently has no medications in their medication list.    Vitamins, Minerals, and/or Supplements:  Men's Health MVI     Food/Medication Interactions:  Reviewed     Food Allergies or Intolerances:  none     Social History    Marital status:    Employment:  [employer] -     Social History     Tobacco Use    Smoking status: Former Smoker     Packs/day: 1.00     Years: 5.00     Pack years: 5.00     Types: Cigarettes     Last attempt to quit: 2004     Years since quitting: 15.5    Smokeless tobacco: Never Used   Substance Use Topics    " Alcohol use: Yes     Alcohol/week: 8.0 standard drinks     Types: 8 Cans of beer per week        Lab Reports   Total Cholesterol:  189    Triglycerides:  79  HDL:  71  LDL:  102.2   Glucose:  124  HbA1c:  5.6  BP:  140/94     Food History  Breakfast:  12 oz coffee with sweet n low  Mid-morning Snack:  2 c. Coffee, pkt fruit and cream oatmeal or grits  Lunch:  Babs soup or Hamburger with fries, water  Mid-afternoon Snack:  none  Dinner:  3 flour steak tortillas, 12 chips/salsa, unsweetened tea  H.S. Snack:  none  *Fluid intake:  Coffee, water, unsweetened tea, ETOH    Exercise History:  none    Cultural/Spiritual/Personal Preferences:  None identified    Support System:  wife    State of Change:  Contemplation    Barriers to Change:  Motivation, time constraints with children, frequent vacations, family and friends not health conscious     Diagnosis    Class 2 Obesity related to lack of aerobic exercise, improper food choices and imbalanced meals  as evidenced by BMI: 37.38 and body fat: 30.69%.    Intervention    RMR (Method:  Body [place]):  2790 kcal  Activity Factor:  1.3  ROWENA:  3627 - 250 = 3377    Goals:  1.  Set alarm for 4:30 am workout at gym for 45 minutes 4x/wk  2.  Restock healhty snacks at work  3.  Reduce dining out for dinner from 3 to 2x/wk  4.  Consider downloading phone david to track steps  5.  Whole grains 25% of the time  6. 1/2 plate vegetables minimum once daily  7.  Switch to natural sweetener  8.  Reserve beer for weekends only and consider lite beer  9.  HAIC: <5.4, Body fat: <30%, Glucose: 110, Wt: 234#     Nutrition Education  Reviewed and explained laboratory results and complimented client on Lipid values, and focused on Glucose and HAIC numbers out of range. Explained foods responsible for increases and encouraged smaller portions and inclusion of whole grains. Discussed advantages of whole grains and examples of such and encourage instant brown rice and requesting whole wheat  tortillas.  Calculated caloric content of beer consumed weekly and compared this to deficit of calories required for wt. Loss per wk. Client agreed to drink Heineken Lite beer on weekends, and to replace drinking beer weekdays after work with another relaxing activity such as bike riding, walking or cleaning car. Discussed brands healthy snack bars, bread, cereal, greek yogurt, and cheese. Briefly reviewed fast food and restaurant dining guides as resources when dining out and encouraged discipline when choosing selections and to succumb peer pressure.     Patient verbalized understanding of nutrition education and recommendations received.    Handouts Provided  Meal Planning Guide  Restaurant Guide  Eat Fit Shopping List  Eat Fit [place]  Fast Food Guide  Vitamin/Mineral Guide    Monitoring/Evaluation    Monitor the following:  Weight  BMI  % Body Fat  Caloric intake  Labs:  HAIC, Glucose    Follow Up Plan:  Communication with referring healthcare provider is unnecessary at this time as patient presented as part of annual wellness exam.  However, will follow up with patient in 1-2 years.

## 2019-11-19 NOTE — PROGRESS NOTES
Subjective:       Patient ID: Eriberto Palomino is a 38 y.o. male.    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  He has no medical complaints, Takes no medications. Was exercising regularly but has slacked off the past month. Weighed 208 when he graduated from [employer]. Now weighs.246 pounds  Review of Systems   Constitutional: Negative.    HENT: Negative.    Eyes: Negative.    Respiratory: Negative.    Cardiovascular: Negative.    Gastrointestinal: Negative.    Genitourinary: Negative.    Musculoskeletal: Negative.    Skin: Negative.    Neurological: Negative.    Psychiatric/Behavioral: Negative.    All other systems reviewed and are negative.      Objective:      Physical Exam   Constitutional: He is oriented to person, place, and time. He appears well-developed and well-nourished.   Overweight at 246  Should weigh less than 200 pounds   HENT:   Head: Normocephalic and atraumatic.   Right Ear: External ear normal.   Left Ear: External ear normal.   Eyes: Pupils are equal, round, and reactive to light. Conjunctivae and EOM are normal.   Neck: Normal range of motion. Neck supple.   Cardiovascular: Normal rate, regular rhythm and normal heart sounds.   Pulmonary/Chest: Effort normal and breath sounds normal.   Abdominal: Soft. Bowel sounds are normal.   Musculoskeletal: Normal range of motion.   Neurological: He is alert and oriented to person, place, and time. He has normal reflexes.   Skin: Skin is warm and dry.   Psychiatric: He has a normal mood and affect. His behavior is normal. Judgment and thought content normal.       Assessment:       1. Annual physical exam        Plan:       Labs:Glucose: 124 should be <110, all olther parameters are normal.Chest x-ray is clear and EKG is normal. IMP Healthy but borderline diabetic Needs to address with diet.

## 2019-11-19 NOTE — PROGRESS NOTES
Subjective:       Patient ID: Eriberto Palomino is a 38 y.o. male.    Chief Complaint: No chief complaint on file.    HPI   Pt. Has no significant cardiovascular or pulmonary history.    Physical Limitations:  None.      Current exercise routine:  Patient does not follow any formal exercise or flexibility routine at the current time.     Goals:  Patient set a year goal weight of 235 lbs.  Fun Facts:  Patient was friendly and engaged.  Patient stated that from mid-august to the end of October, he was regularly exercising.  Patient stated that he was walking/jogging on the treadmill for 20-25 minutes, performing full-body resistance training exercises, and stretching, 4 days a week.  Patient then went on vacation and fell out of routine.  Patient seemed motivated to get back into a regular exercise routine.  Patient was receptive to all recommendations made.      Review of Systems    Objective:     The fitness evaluation results are as follows:  D.O.S. 11/19/2019 11/13/2018   Height (in): 68.5 68.5   Weight (lbs): 249 247   BMI: 37.198480 37.623502   Body Fat (%): 30.69 28.73   Waist (cm): 107 109   Hip (cm): 121 122   WHR: 0.88 0.89   RBP (mmHg): 140/94 116/84   RHR (bpm): 76 74    Strength R (lbs)t: 130 135    Strength Lt (lbs): 140 146.95122   Push-up Assessment: 48 45   Curl-up Assessment: 14 0   Flexibility Testing (cm): 27 29   REE (kcals): 2790 1820       Physical Exam    Assessment:     Age/gender stratified assessment:  Resting BP: Elevated   Body Fat %: Poor   WHR Risk Factor: Low Risk    Strength R: Average    Strength L: Above Average   Upper Body Endurance: Excellent   Abdominal Endurance: Well Below Average   Lower body Flexibiltiy: Fair       1. Routine general medical examination at a health care facility        Plan:       Recommended fitness guidelines:    -150 minutes of moderate intensity aerobic exercise per week or 75 minutes of vigorous intensity aerobic exercise per week.  Try to  reach a minimum of 150 minutes of moderate to vigorous intensity aerobic activity by walking or jogging for 30 minutes, 5 days a week.  Incorporate some interval training to increase your heart rate for short periods of time.     -2 to 4 days per week of resistance training for each muscle group.      -Daily stretching with a hold of at least 30 seconds per muscle group.  Practice the seated hamstring stretch, demonstrated during the evaluation, daily.

## 2020-03-05 ENCOUNTER — OFFICE VISIT (OUTPATIENT)
Dept: FAMILY MEDICINE | Facility: CLINIC | Age: 40
End: 2020-03-05
Payer: COMMERCIAL

## 2020-03-05 VITALS
OXYGEN SATURATION: 97 % | TEMPERATURE: 98 F | WEIGHT: 241.38 LBS | HEIGHT: 69 IN | BODY MASS INDEX: 35.75 KG/M2 | SYSTOLIC BLOOD PRESSURE: 122 MMHG | HEART RATE: 68 BPM | DIASTOLIC BLOOD PRESSURE: 80 MMHG

## 2020-03-05 DIAGNOSIS — L08.9 INFECTED SEBACEOUS CYST: Primary | ICD-10-CM

## 2020-03-05 DIAGNOSIS — L72.3 INFECTED SEBACEOUS CYST: Primary | ICD-10-CM

## 2020-03-05 PROCEDURE — 99214 PR OFFICE/OUTPT VISIT, EST, LEVL IV, 30-39 MIN: ICD-10-PCS | Mod: S$GLB,,, | Performed by: NURSE PRACTITIONER

## 2020-03-05 PROCEDURE — 99214 OFFICE O/P EST MOD 30 MIN: CPT | Mod: S$GLB,,, | Performed by: NURSE PRACTITIONER

## 2020-03-05 PROCEDURE — 99999 PR PBB SHADOW E&M-EST. PATIENT-LVL IV: CPT | Mod: PBBFAC,,, | Performed by: NURSE PRACTITIONER

## 2020-03-05 PROCEDURE — 3008F BODY MASS INDEX DOCD: CPT | Mod: CPTII,S$GLB,, | Performed by: NURSE PRACTITIONER

## 2020-03-05 PROCEDURE — 99999 PR PBB SHADOW E&M-EST. PATIENT-LVL IV: ICD-10-PCS | Mod: PBBFAC,,, | Performed by: NURSE PRACTITIONER

## 2020-03-05 PROCEDURE — 3008F PR BODY MASS INDEX (BMI) DOCUMENTED: ICD-10-PCS | Mod: CPTII,S$GLB,, | Performed by: NURSE PRACTITIONER

## 2020-03-05 RX ORDER — CEPHALEXIN 500 MG/1
500 CAPSULE ORAL EVERY 8 HOURS
Qty: 30 CAPSULE | Refills: 0 | Status: SHIPPED | OUTPATIENT
Start: 2020-03-05 | End: 2020-03-15

## 2020-03-05 RX ORDER — MUPIROCIN 20 MG/G
OINTMENT TOPICAL 3 TIMES DAILY
Qty: 30 G | Refills: 0 | Status: SHIPPED | OUTPATIENT
Start: 2020-03-05 | End: 2020-03-15

## 2020-03-05 NOTE — PATIENT INSTRUCTIONS
Warm compresses twice a day encourage any drainage   Keflex 500 mg one every 8 hours x 10 days   Apply Bactroban twice a day   Referral to surgery

## 2020-03-05 NOTE — PROGRESS NOTES
, for which days and Subjective:       Patient ID: Eriberto Palomino is a 39 y.o. male.    Chief Complaint: Cyst (On back    5 days)    39-year-old male presents to the clinic today with complaint of a infected sebaceous cyst to his back x5 days.  He states that it has been draining some white-colored fluid.  He denies any fever or chills.  He rates the pain a 1/10.  He states the cyst has been there about 4-5 months.  He has had a previous cyst removed in his back several years ago.  He denies any other complaints today.    History reviewed. No pertinent past medical history.  Past Surgical History:   Procedure Laterality Date    VASECTOMY        reports that he quit smoking about 15 years ago. His smoking use included cigarettes. He has a 5.00 pack-year smoking history. He has never used smokeless tobacco. He reports that he drinks about 8.0 standard drinks of alcohol per week. He reports that he does not use drugs.  Review of Systems   Constitutional: Negative for chills and fever.   Respiratory: Negative for cough, shortness of breath and wheezing.    Gastrointestinal: Negative for abdominal pain, diarrhea, nausea and vomiting.   Musculoskeletal: Negative for gait problem.   Skin:        Infected cyst back        Objective:      Physical Exam   Constitutional: He is oriented to person, place, and time. He appears well-developed and well-nourished. He appears distressed.   Eyes: Pupils are equal, round, and reactive to light. Conjunctivae and EOM are normal. Right eye exhibits no discharge. Left eye exhibits no discharge. No scleral icterus.   Cardiovascular: Normal rate, regular rhythm and normal heart sounds.   Pulmonary/Chest: Effort normal and breath sounds normal. He has no wheezes.   Abdominal: Soft. Bowel sounds are normal. There is no tenderness.   Musculoskeletal: Normal range of motion. He exhibits no edema.   Neurological: He is alert and oriented to person, place, and time.   Skin: He is not  diaphoretic.   Cyst that is red and inflamed to his lower back see photo          Assessment:       1. Infected sebaceous cyst        Plan:         Infected sebaceous cyst  -     cephALEXin (KEFLEX) 500 MG capsule; Take 1 capsule (500 mg total) by mouth every 8 (eight) hours. for 10 days  Dispense: 30 capsule; Refill: 0  -     mupirocin (BACTROBAN) 2 % ointment; Apply topically 3 (three) times daily. for 10 days  Dispense: 30 g; Refill: 0  -     Ambulatory referral/consult to General Surgery; Future; Expected date: 03/12/2020

## 2020-03-06 ENCOUNTER — TELEPHONE (OUTPATIENT)
Dept: FAMILY MEDICINE | Facility: CLINIC | Age: 40
End: 2020-03-06

## 2020-03-06 NOTE — TELEPHONE ENCOUNTER
Patient reports missing a call from this office.  He thinks it may be related to a referral placed on yesterday for general surgery.  Advised to call [phone] for additional information regarding this.  Verbalized understanding.    no

## 2020-03-17 ENCOUNTER — OFFICE VISIT (OUTPATIENT)
Dept: SURGERY | Facility: CLINIC | Age: 40
End: 2020-03-17
Payer: COMMERCIAL

## 2020-03-17 VITALS
HEIGHT: 69 IN | DIASTOLIC BLOOD PRESSURE: 91 MMHG | SYSTOLIC BLOOD PRESSURE: 139 MMHG | WEIGHT: 241.5 LBS | HEART RATE: 79 BPM | BODY MASS INDEX: 35.77 KG/M2

## 2020-03-17 DIAGNOSIS — L08.9 INFECTED SEBACEOUS CYST: ICD-10-CM

## 2020-03-17 DIAGNOSIS — L72.3 INFECTED SEBACEOUS CYST: ICD-10-CM

## 2020-03-17 PROCEDURE — 99204 OFFICE O/P NEW MOD 45 MIN: CPT | Mod: S$GLB,,, | Performed by: SURGERY

## 2020-03-17 PROCEDURE — 99204 PR OFFICE/OUTPT VISIT, NEW, LEVL IV, 45-59 MIN: ICD-10-PCS | Mod: S$GLB,,, | Performed by: SURGERY

## 2020-03-17 PROCEDURE — 3008F PR BODY MASS INDEX (BMI) DOCUMENTED: ICD-10-PCS | Mod: CPTII,S$GLB,, | Performed by: SURGERY

## 2020-03-17 PROCEDURE — 3008F BODY MASS INDEX DOCD: CPT | Mod: CPTII,S$GLB,, | Performed by: SURGERY

## 2020-03-17 NOTE — LETTER
March 17, 2020      Maximilian Michaels, FNP-C  [address] [address]           [facility]  [address], [address]  [facility] [address]  Phone: [phone]  Fax: [phone]          Patient: Eriberto Palomino   MR Number: [id number]   YOB: 1980   Date of Visit: 3/17/2020       Dear Maximilian Michaels:    Thank you for referring Eriberto Palomino to me for evaluation. Attached you will find relevant portions of my assessment and plan of care.    If you have questions, please do not hesitate to call me. I look forward to following Eriberto Palomino along with you.    Sincerely,    Rayray Ross MD    Enclosure  CC:  No Recipients    If you would like to receive this communication electronically, please contact [email or web address] or [phone] to request more information on [employer] access.    For providers and/or their staff who would like to refer a patient to [facility], please contact us through our one-stop-shop provider referral line, [facility] [place], at [phone].    If you feel you have received this communication in error or would no longer like to receive these types of communications, please e-mail [email or web address]

## 2020-03-17 NOTE — PROGRESS NOTES
"History & Physical    SUBJECTIVE:     History of Present Illness:  Patient is a 39 y.o. male presents with infected epidermal cyst with some resolution but still active.     Chief Complaint   Patient presents with    Consult    Cyst     sebacous cyst Mid back       Review of patient's allergies indicates:  No Known Allergies    No current outpatient medications on file.     No current facility-administered medications for this visit.        History reviewed. No pertinent past medical history.  Past Surgical History:   Procedure Laterality Date    VASECTOMY       Family History   Problem Relation Age of Onset    Hypertension Father     Emphysema Paternal Grandmother      Social History     Tobacco Use    Smoking status: Former Smoker     Packs/day: 1.00     Years: 5.00     Pack years: 5.00     Types: Cigarettes     Last attempt to quit: 5/1/2004     Years since quitting: 15.8    Smokeless tobacco: Never Used   Substance Use Topics    Alcohol use: Yes     Alcohol/week: 8.0 standard drinks     Types: 8 Cans of beer per week    Drug use: No        Review of Systems:  Review of Systems   Constitutional: Negative.    HENT: Negative.    Eyes: Negative.    Respiratory: Negative.    Cardiovascular: Negative.    Gastrointestinal: Negative.    Endocrine: Negative.    Musculoskeletal: Negative.    Skin: Negative.    Allergic/Immunologic: Negative.    Neurological: Negative.    Hematological: Negative.    Psychiatric/Behavioral: Negative.    All other systems reviewed and are negative.      OBJECTIVE:     Vital Signs (Most Recent)  Pulse: 79 (03/17/20 1443)  BP: (!) 139/91 (03/17/20 1443)  5' 9" (1.753 m)  109.5 kg (241 lb 8.2 oz)     Physical Exam:  Physical Exam   Constitutional: He is oriented to person, place, and time. He appears well-developed and well-nourished.   HENT:   Head: Normocephalic and atraumatic.   Right Ear: External ear normal.   Left Ear: External ear normal.   Nose: Nose normal.   Mouth/Throat: " Oropharynx is clear and moist.   Eyes: Pupils are equal, round, and reactive to light. Conjunctivae and EOM are normal.   Neck: Normal range of motion. Neck supple.   Cardiovascular: Normal rate, regular rhythm, normal heart sounds and intact distal pulses.   Pulmonary/Chest: Effort normal and breath sounds normal.           Abdominal: Soft. Bowel sounds are normal.   Musculoskeletal: Normal range of motion.   Neurological: He is alert and oriented to person, place, and time. He has normal reflexes.   Skin: Skin is warm and dry.   Psychiatric: He has a normal mood and affect. His behavior is normal. Thought content normal.   Vitals reviewed.      Laboratory  none    Diagnostic Results:  none    ASSESSMENT/PLAN:     Infected inclusion cyst    PLAN:Plan     Continue local abx and then schedule him for excision with the risks explained

## 2020-05-04 ENCOUNTER — TELEPHONE (OUTPATIENT)
Dept: SURGERY | Facility: CLINIC | Age: 40
End: 2020-05-04

## 2020-08-14 DIAGNOSIS — Z11.59 NEED FOR HEPATITIS C SCREENING TEST: ICD-10-CM

## 2021-03-26 NOTE — PROGRESS NOTES
A Healthy Lifestyle: Care Instructions Your Care Instructions A healthy lifestyle can help you feel good, stay at a healthy weight, and have plenty of energy for both work and play. A healthy lifestyle is something you can share with your whole family. A healthy lifestyle also can lower your risk for serious health problems, such as high blood pressure, heart disease, and diabetes. You can follow a few steps listed below to improve your health and the health of your family. Follow-up care is a key part of your treatment and safety. Be sure to make and go to all appointments, and call your doctor if you are having problems. It's also a good idea to know your test results and keep a list of the medicines you take. How can you care for yourself at home? · Do not eat too much sugar, fat, or fast foods. You can still have dessert and treats now and then. The goal is moderation. · Start small to improve your eating habits. Pay attention to portion sizes, drink less juice and soda pop, and eat more fruits and vegetables. ? Eat a healthy amount of food. A 3-ounce serving of meat, for example, is about the size of a deck of cards. Fill the rest of your plate with vegetables and whole grains. ? Limit the amount of soda and sports drinks you have every day. Drink more water when you are thirsty. ? Eat at least 5 servings of fruits and vegetables every day. It may seem like a lot, but it is not hard to reach this goal. A serving or helping is 1 piece of fruit, 1 cup of vegetables, or 2 cups of leafy, raw vegetables. Have an apple or some carrot sticks as an afternoon snack instead of a candy bar. Try to have fruits and/or vegetables at every meal. 
· Make exercise part of your daily routine. You may want to start with simple activities, such as walking, bicycling, or slow swimming. Try to be active 30 to 60 minutes every day. You do not need to do all 30 to 60 minutes all at once.  For example, you can Influenza Vaccine: Orders entered    exercise 3 times a day for 10 or 20 minutes. Moderate exercise is safe for most people, but it is always a good idea to talk to your doctor before starting an exercise program. 
· Keep moving. Wilver Fickle the lawn, work in the garden, or [employer]. Take the stairs instead of the elevator at work. · If you smoke, quit. People who smoke have an increased risk for heart attack, stroke, cancer, and other lung illnesses. Quitting is hard, but there are ways to boost your chance of quitting tobacco for good. ? Use nicotine gum, patches, or lozenges. ? Ask your doctor about stop-smoking programs and medicines. ? Keep trying. In addition to reducing your risk of diseases in the future, you will notice some benefits soon after you stop using tobacco. If you have shortness of breath or asthma symptoms, they will likely get better within a few weeks after you quit. · Limit how much alcohol you drink. Moderate amounts of alcohol (up to 2 drinks a day for men, 1 drink a day for women) are okay. But drinking too much can lead to liver problems, high blood pressure, and other health problems. Family health If you have a family, there are many things you can do together to improve your health. · Eat meals together as a family as often as possible. · Eat healthy foods. This includes fruits, vegetables, lean meats and dairy, and whole grains. · Include your family in your fitness plan. Most people think of activities such as jogging or tennis as the way to fitness, but there are many ways you and your family can be more active. Anything that makes you breathe hard and gets your heart pumping is exercise. Here are some tips: 
? Walk to do errands or to take your child to school or the bus. 
? Go for a family bike ride after dinner instead of watching TV. Where can you learn more? Go to http://www.gray.com/ Enter D411 in the search box to learn more about \"A Healthy Lifestyle: Care Instructions. \" Current as of: January 31, 2020               Content Version: 12.6 © 1769-4167 1.[employer]. Care instructions adapted under license by [employer] (which disclaims liability or warranty for this information). If you have questions about a medical condition or this instruction, always ask your healthcare professional. [address] any warranty or liability for your use of this information.

## 2022-10-18 DIAGNOSIS — Z00.00 ROUTINE GENERAL MEDICAL EXAMINATION AT A HEALTH CARE FACILITY: Primary | ICD-10-CM

## 2022-11-09 ENCOUNTER — CLINICAL SUPPORT (OUTPATIENT)
Dept: INTERNAL MEDICINE | Facility: CLINIC | Age: 42
End: 2022-11-09

## 2022-11-09 ENCOUNTER — OFFICE VISIT (OUTPATIENT)
Dept: PULMONOLOGY | Facility: CLINIC | Age: 42
End: 2022-11-09

## 2022-11-09 ENCOUNTER — HOSPITAL ENCOUNTER (OUTPATIENT)
Dept: CARDIOLOGY | Facility: HOSPITAL | Age: 42
Discharge: HOME | End: 2022-11-09
Attending: INTERNAL MEDICINE
Payer: COMMERCIAL

## 2022-11-09 VITALS — BODY MASS INDEX: 36.14 KG/M2 | HEIGHT: 69 IN | WEIGHT: 244 LBS

## 2022-11-09 DIAGNOSIS — Z00.00 ROUTINE GENERAL MEDICAL EXAMINATION AT A HEALTH CARE FACILITY: Primary | ICD-10-CM

## 2022-11-09 DIAGNOSIS — Z00.00 ROUTINE GENERAL MEDICAL EXAMINATION AT A HEALTH CARE FACILITY: ICD-10-CM

## 2022-11-09 DIAGNOSIS — Z00.00 ANNUAL PHYSICAL EXAM: Primary | ICD-10-CM

## 2022-11-09 LAB
ALBUMIN SERPL BCP-MCNC: 4.3 G/DL (ref 3.5–5.2)
ALP SERPL-CCNC: 52 U/L (ref 55–135)
ALT SERPL W/O P-5'-P-CCNC: 23 U/L (ref 10–44)
ANION GAP SERPL CALC-SCNC: 9 MMOL/L (ref 8–16)
AST SERPL-CCNC: 17 U/L (ref 10–40)
BILIRUB SERPL-MCNC: 0.7 MG/DL (ref 0.1–1)
BUN SERPL-MCNC: 21 MG/DL (ref 6–20)
CALCIUM SERPL-MCNC: 9.2 MG/DL (ref 8.7–10.5)
CHLORIDE SERPL-SCNC: 104 MMOL/L (ref 95–110)
CHOLEST SERPL-MCNC: 186 MG/DL (ref 120–199)
CHOLEST/HDLC SERPL: 2.8 {RATIO} (ref 2–5)
CO2 SERPL-SCNC: 25 MMOL/L (ref 23–29)
COMPLEXED PSA SERPL-MCNC: 0.5 NG/ML (ref 0–4)
CREAT SERPL-MCNC: 1.1 MG/DL (ref 0.5–1.4)
CV STRESS BASE HR: 74 BPM
DIASTOLIC BLOOD PRESSURE: 97 MMHG
ERYTHROCYTE [DISTWIDTH] IN BLOOD BY AUTOMATED COUNT: 11.6 % (ref 11.5–14.5)
EST. GFR  (NO RACE VARIABLE): >60 ML/MIN/1.73 M^2
ESTIMATED AVG GLUCOSE: 114 MG/DL (ref 68–131)
GLUCOSE SERPL-MCNC: 125 MG/DL (ref 70–110)
HBA1C MFR BLD: 5.6 % (ref 4–5.6)
HCT VFR BLD AUTO: 44.8 % (ref 40–54)
HDLC SERPL-MCNC: 67 MG/DL (ref 40–75)
HDLC SERPL: 36 % (ref 20–50)
HGB BLD-MCNC: 14.7 G/DL (ref 14–18)
HIV 1+2 AB+HIV1 P24 AG SERPL QL IA: NORMAL
LDLC SERPL CALC-MCNC: 106 MG/DL (ref 63–159)
MCH RBC QN AUTO: 29.3 PG (ref 27–31)
MCHC RBC AUTO-ENTMCNC: 32.8 G/DL (ref 32–36)
MCV RBC AUTO: 89 FL (ref 82–98)
NONHDLC SERPL-MCNC: 119 MG/DL
OHS CV CPX 1 MINUTE RECOVERY HEART RATE: 144 BPM
OHS CV CPX 85 PERCENT MAX PREDICTED HEART RATE MALE: 153
OHS CV CPX ESTIMATED METS: 16
OHS CV CPX MAX PREDICTED HEART RATE: 180
OHS CV CPX PATIENT IS FEMALE: 0
OHS CV CPX PATIENT IS MALE: 1
OHS CV CPX PEAK DIASTOLIC BLOOD PRESSURE: 61 MMHG
OHS CV CPX PEAK HEAR RATE: 173 BPM
OHS CV CPX PEAK RATE PRESSURE PRODUCT: NORMAL
OHS CV CPX PEAK SYSTOLIC BLOOD PRESSURE: 191 MMHG
OHS CV CPX PERCENT MAX PREDICTED HEART RATE ACHIEVED: 96
OHS CV CPX RATE PRESSURE PRODUCT PRESENTING: 9398
PLATELET # BLD AUTO: 212 K/UL (ref 150–450)
PMV BLD AUTO: 10.7 FL (ref 9.2–12.9)
POTASSIUM SERPL-SCNC: 4.2 MMOL/L (ref 3.5–5.1)
PROT SERPL-MCNC: 7.3 G/DL (ref 6–8.4)
RBC # BLD AUTO: 5.01 M/UL (ref 4.6–6.2)
SODIUM SERPL-SCNC: 138 MMOL/L (ref 136–145)
STRESS ECHO POST EXERCISE DUR MIN: 9 MINUTES
STRESS ECHO POST EXERCISE DUR SEC: 41 SECONDS
SYSTOLIC BLOOD PRESSURE: 127 MMHG
TRIGL SERPL-MCNC: 65 MG/DL (ref 30–150)
TSH SERPL DL<=0.005 MIU/L-ACNC: 1.63 UIU/ML (ref 0.4–4)
WBC # BLD AUTO: 5.45 K/UL (ref 3.9–12.7)

## 2022-11-09 PROCEDURE — 99211 PR NURSE VISIT, 15 MINS, EXEC HLTH ONLY: ICD-10-PCS | Mod: S$GLB,,, | Performed by: INTERNAL MEDICINE

## 2022-11-09 PROCEDURE — 93016 EXERCISE STRESS - EKG (CUPID ONLY): ICD-10-PCS | Mod: ,,, | Performed by: INTERNAL MEDICINE

## 2022-11-09 PROCEDURE — 97750 PHYSICAL PERFORMANCE TEST: CPT | Mod: S$GLB,,, | Performed by: INTERNAL MEDICINE

## 2022-11-09 PROCEDURE — 93016 CV STRESS TEST SUPVJ ONLY: CPT | Mod: ,,, | Performed by: INTERNAL MEDICINE

## 2022-11-09 PROCEDURE — 97802 MEDICAL NUTRITION INDIV IN: CPT | Mod: S$GLB,,, | Performed by: INTERNAL MEDICINE

## 2022-11-09 PROCEDURE — 99999 PR PBB SHADOW E&M-EST. PATIENT-LVL I: CPT | Mod: PBBFAC,,,

## 2022-11-09 PROCEDURE — 99999 PR PBB SHADOW E&M-EST. PATIENT-LVL I: ICD-10-PCS | Mod: PBBFAC,,,

## 2022-11-09 PROCEDURE — 97750 PR PHYSICAL PERFORMANCE TEST: ICD-10-PCS | Mod: S$GLB,,, | Performed by: INTERNAL MEDICINE

## 2022-11-09 PROCEDURE — 99999 PR PBB SHADOW E&M-EST. PATIENT-LVL II: ICD-10-PCS | Mod: PBBFAC,,, | Performed by: INTERNAL MEDICINE

## 2022-11-09 PROCEDURE — 84153 ASSAY OF PSA TOTAL: CPT | Performed by: INTERNAL MEDICINE

## 2022-11-09 PROCEDURE — 97802 PR MED NUTR THER, 1ST, INDIV, EA 15 MIN: ICD-10-PCS | Mod: S$GLB,,, | Performed by: INTERNAL MEDICINE

## 2022-11-09 PROCEDURE — 83036 HEMOGLOBIN GLYCOSYLATED A1C: CPT | Performed by: INTERNAL MEDICINE

## 2022-11-09 PROCEDURE — 99999 PR PBB SHADOW E&M-EST. PATIENT-LVL II: CPT | Mod: PBBFAC,,, | Performed by: INTERNAL MEDICINE

## 2022-11-09 PROCEDURE — 99386 PR PREVENTIVE VISIT,NEW,40-64: ICD-10-PCS | Mod: S$GLB,,, | Performed by: INTERNAL MEDICINE

## 2022-11-09 PROCEDURE — 93018 EXERCISE STRESS - EKG (CUPID ONLY): ICD-10-PCS | Mod: ,,, | Performed by: INTERNAL MEDICINE

## 2022-11-09 PROCEDURE — 87389 HIV-1 AG W/HIV-1&-2 AB AG IA: CPT | Performed by: INTERNAL MEDICINE

## 2022-11-09 PROCEDURE — 93018 CV STRESS TEST I&R ONLY: CPT | Mod: ,,, | Performed by: INTERNAL MEDICINE

## 2022-11-09 PROCEDURE — 84443 ASSAY THYROID STIM HORMONE: CPT | Performed by: INTERNAL MEDICINE

## 2022-11-09 PROCEDURE — 93017 CV STRESS TEST TRACING ONLY: CPT

## 2022-11-09 PROCEDURE — 80053 COMPREHEN METABOLIC PANEL: CPT | Performed by: INTERNAL MEDICINE

## 2022-11-09 PROCEDURE — 99386 PREV VISIT NEW AGE 40-64: CPT | Mod: S$GLB,,, | Performed by: INTERNAL MEDICINE

## 2022-11-09 PROCEDURE — 99211 OFF/OP EST MAY X REQ PHY/QHP: CPT | Mod: S$GLB,,, | Performed by: INTERNAL MEDICINE

## 2022-11-09 PROCEDURE — 80061 LIPID PANEL: CPT | Performed by: INTERNAL MEDICINE

## 2022-11-09 PROCEDURE — 85027 COMPLETE CBC AUTOMATED: CPT | Performed by: INTERNAL MEDICINE

## 2022-11-09 NOTE — PROGRESS NOTES
"Nutrition Assessment  Session Time:  45 minutes      Client name:  Eriberto Palomino  :  1980  Age:  41 y.o.  Gender:  male    Client states:  Very pleasant gentleman here for his Executive Health physical.   with two children, ages 8 and 9.  Both involved in extracurricular activities.  Denies significant medical encounters this past year and takes no routine rx medications or supplements daily.  Adopted the [employer] "5 and 1" meal plan over the summer and lost >10# in one month.  As a result of such, improved energy levels and physical mobility.  However, regained such weight once previous habits returned.  Desires to lose weight and achieve long term goal weight of 220# in contrast to current weight of 242#.  Understands the importance of sustainability and behavior change, admitting to increased ETOH (beer) intake (~8 beers/week), increased dining out frequency, and reduced vegetable intake.  Typically consumes three meals daily and brings lunch to work ~3x/week.  May dine out for dinner due to time constraints with work and children's extracurricular activities.  Believes it to be more realistic to reduce dining out frequency at lunch in lieu of dinner at this time.  Favors Heineken beer although realizes caloric content and value of lite beer.  Is not currently exercising although interested in resuming as he understands its importance in relation to overall health and weight management.  Overall, desires to achieve long term goal weight of 220#.      *Patient's last name is pronounced, "Daniel."    Anthropometrics  Height:  5' 9"     Weight:  242.3#  BMI:  35.9  % Body Fat:  34.1%    Clinical Signs/Symptoms  N/V/D:  None  Appetite:  good       No past medical history on file.    Past Surgical History:   Procedure Laterality Date    VASECTOMY         Medications    currently has no medications in their medication list.    Vitamins, Minerals, and/or Supplements:  None     Food/Medication " Interactions:  Reviewed     Food Allergies or Intolerances:  NKFA     Social History    Marital status:    Employment:  [employer]    Social History     Tobacco Use    Smoking status: Former     Packs/day: 1.00     Years: 5.00     Pack years: 5.00     Types: Cigarettes     Quit date: 2004     Years since quittin.5     Passive exposure: Past    Smokeless tobacco: Never   Substance Use Topics    Alcohol use: Yes     Alcohol/week: 8.0 standard drinks     Types: 8 Cans of beer per week        Lab Reports   Sodium   Date Value Ref Range Status   2022 138 136 - 145 mmol/L Final     Potassium   Date Value Ref Range Status   2022 4.2 3.5 - 5.1 mmol/L Final     Chloride   Date Value Ref Range Status   2022 104 95 - 110 mmol/L Final     CO2   Date Value Ref Range Status   2022 25 23 - 29 mmol/L Final     Glucose   Date Value Ref Range Status   2022 125 (H) 70 - 110 mg/dL Final     BUN   Date Value Ref Range Status   2022 21 (H) 6 - 20 mg/dL Final     Creatinine   Date Value Ref Range Status   2022 1.1 0.5 - 1.4 mg/dL Final     Calcium   Date Value Ref Range Status   2022 9.2 8.7 - 10.5 mg/dL Final     Total Protein   Date Value Ref Range Status   2022 7.3 6.0 - 8.4 g/dL Final     Albumin   Date Value Ref Range Status   2022 4.3 3.5 - 5.2 g/dL Final     Total Bilirubin   Date Value Ref Range Status   2022 0.7 0.1 - 1.0 mg/dL Final     Comment:     For infants and newborns, interpretation of results should be based  on gestational age, weight and in agreement with clinical  observations.    Premature Infant recommended reference ranges:  Up to 24 hours.............<8.0 mg/dL  Up to 48 hours............<12.0 mg/dL  3-5 days..................<15.0 mg/dL  6-29 days.................<15.0 mg/dL       Alkaline Phosphatase   Date Value Ref Range Status   2022 52 (L) 55 - 135 U/L Final     AST   Date Value Ref Range Status   2022 17  10 - 40 U/L Final     ALT   Date Value Ref Range Status   11/09/2022 23 10 - 44 U/L Final     Anion Gap   Date Value Ref Range Status   11/09/2022 9 8 - 16 mmol/L Final     eGFR if    Date Value Ref Range Status   11/19/2019 >60.0 >60 mL/min/1.73 m^2 Final     eGFR if non    Date Value Ref Range Status   11/19/2019 >60.0 >60 mL/min/1.73 m^2 Final     Comment:     Calculation used to obtain the estimated glomerular filtration  rate (eGFR) is the CKD-EPI equation.         Lab Results   Component Value Date    WBC 5.45 11/09/2022    HGB 14.7 11/09/2022    HCT 44.8 11/09/2022    MCV 89 11/09/2022     11/09/2022        Lab Results   Component Value Date    CHOL 186 11/09/2022     Lab Results   Component Value Date    HDL 67 11/09/2022     Lab Results   Component Value Date    LDLCALC 106.0 11/09/2022     Lab Results   Component Value Date    TRIG 65 11/09/2022     Lab Results   Component Value Date    CHOLHDL 36.0 11/09/2022     Lab Results   Component Value Date    HGBA1C 5.6 11/09/2022     BP Readings from Last 1 Encounters:   03/17/20 (!) 139/91       Food History  Breakfast:  1-2 packets instant oatmeal + coffee  Mid-morning Snack:  +/- Premier Protein shake  Lunch:  Dinner leftovers/Chicken caesar wrap + water  Mid-afternoon Snack:  None  Dinner:  [employer] noodles + meatloaf + dumplings   Snack:  None  *Fluid intake:  Coffee, water, tea, ETOH (~8 beers/week)    Exercise History:  No formal exercise routine at this time    Cultural/Spiritual/Personal Preferences:  None identified    Support System:  spouse and co-workers    State of Change:  Contemplation    Barriers to Change:  Time constraints    Diagnosis    Overweight related to inadequate physical activity and excessive energy intake as evidenced by BMI:  35.9.    Intervention    RMR (Method:  InBody):  1935 kcal  Activity Factor:  1.3    ROWENA:  2515 - 500 = 2015 kcal    Goals:  1.  Achieve 5% (12#) weight loss x 3-6  months, ultimately achieving personal long term goal weight of 220#  2.  Develop exercise routine, adhering to fitness recommendations provided by EP  3.  Bring lunch to work 4x/week  4.  Incorporate a source of non-starchy vegetables with lunch and dinner 4-5x/week  5.  Consider switching to lite beer  6.  Pair RTD protein shake with 1 packet oatmeal for breakfast    Nutrition Education  The following education was provided to the patient:  Discussed meal planning/[employer] My Plate design.  Discussed healthy snacking.   Discussed weight management.  Suggested dietary modifications based on current dietary behaviors and individual food preferences.  Discussed physical activity guidelines and its associated benefits.  Discussed nutrition-related lab values and dietary and/or lifestyle factors affecting them.  Discussed alcohol consumption (potential health consequences of excessive alcohol intake, recommended intake for men and women, recommended serving sizes, ways to reduce and/or moderate intake, etc.).  Discussed importance of small behavior/habit changes in improving long-term adherence and sustainability.  Discussed goal setting.  Discussed the pros and cons of fad diets and provided recommendations regarding short and long term adherence if applicable.  Provided ongoing support, encouragement, and guidance toward improved health efforts.    Patient verbalized understanding of nutrition education and recommendations received.    Handouts Provided  Meal Planning Guide  Restaurant Guide  Eat Fit Shopping List  Eat Fit [place]atha  Fueling Well On-The-Go    Monitoring/Evaluation    Monitor the following:  Weight  BMI  % Body Fat  Caloric intake  Lipid panel  HbA1c    Follow Up Plan:  Communication with referring healthcare provider is unnecessary at this time as patient presented as part of annual wellness exam.  However, will follow up with patient in 1-2 years.

## 2022-11-09 NOTE — LETTER
November 11, 2022    Eriberto Palomino  [address] [facility] [address]             [facility] [address]  [address]  [facility] [address]  Phone: [phone] Dear  Eriberto,         Thank you for allowing me to serve you and perform your Executive Health exam on 11/9/2022. This letter will serve as a brief summary of the physical findings and laboratory/studies performed and recommendations at this time. Except for your weight, this is a normal exam. I am worried about you wearing out your hips and knees from the extra weight.         If you have any questions or concerns, please don't hesitate to call.    Sincerely,        Fortino Song MD

## 2022-11-09 NOTE — PROGRESS NOTES
Subjective:       Patient ID: Eriberto Palomino is a 41 y.o. male.    Chief Complaint: No chief complaint on file.    HPI  Review of Systems    Mr. Palomino reports no hx of CVD or PD. He notes no limitations to PA. He notes decreased activity levels since his last visit.     Current exercise: Currently, walking during work and keeping active at home ~10-12K steps daily. Interested in resuming exercise routine.    Notes: Mr. Palomino was receptive to all recommendations. A former power , and , and rugby player, he recognizes the benefit of regular exercise and would like to resume a regular exercise routine. Discussed daily activity levels at work, with an average of 10-12K steps daily. Discussed weight management goals of ~25lbs in the next year. Recommended to maintain daily step count during weight loss period while resuming resistance training 2-3x/wk for each major muscle group.   Objective:      D.O.S. 11/96/2022 11/19/2019 11/13/2018   Height (in): 69 68.5 68.5   Weight (lbs): 242.3 249 247   BMI: 35.9 37.4 37.1   Body Fat (%): 34.10 30.69 28.73   Waist (cm): 110 107 109   RBP (mmHg): 134/90 140/94 116/84   RHR (bpm): 80 76 74    Strength Dominant (Lbs): 135 130 135    Strength Non Dominant (Lbs): 140 140 147   Push-up Assessment: 31 48 45   Curl-up Assessment: 27 14 0   Flexibility Testing (cm): 29 27 29   REE (kcals): 1935 2600 1820     Physical Exam    Assessment:       Resting BP: Within Normal Limits   Body Fat %: Poor   Waist Circumfernece: Very High    Strength Dominant: 90th    Strength Non Dominant: 90th   Upper Body Endurance: Excellent   Abdominal Endurance: Below Average   Lower body Flexibiltiy: Very Good     Problem List Items Addressed This Visit    None  Visit Diagnoses       Routine general medical examination at a health care facility    -  Primary              Plan:     Mr. Palomino should strive to begin regular exercise within the following guidelines:    -  Aerobic: Perform 30-60min/day 5x/wk (>150min/wk) of moderate (119-139bpm) intensity exercise. More vigorous aerobics (>139bpm) can maintain or improve cardiovascular health with at least 75min/wk.  Recommended to begin regular aerobic training routine in order to meet above guidelines. Recommended to maintain step counts 10K or greater. Additional aerobics, up to 300min/wk at moderate or 150min/wk at vigorous can yield greater benefits to weight management, but will be more time consuming.     - Resistance: Perform 2-4 sets of 8-12 repetitions at moderate intensity 2-4x/wk. for each muscle group.  Recommended to continue/begin resistance training program to maintain or increase muscular strength and endurance. Body weight, free weight, and resistance band training are all effective in maintaining muscular health. Body weight and resistance band exercise can serve as a great Plan B to keep exercise habits when getting to the gym is difficult.    - Flexibility: Perform 2-4 stretches for 30s for each muscle group daily for best results.  Daily stretching should be performed in order to maintain or increase current level of flexibility.

## 2022-11-09 NOTE — PROGRESS NOTES
Subjective:       Patient ID: Eriberto Palomino is a 41 y.o. male.    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 I last saw him, November,2019. No intercurrent illnesses or medical encounters. He is overweight, big frame and very muscular, Weighted 208 when he graduated from [employer] [facility] today weighs: 245 pounds. Needs  to address his weight as his only long term health issue.Takes no medications.  Review of Systems   Constitutional: Negative.    HENT: Negative.     Eyes: Negative.    Respiratory: Negative.     Cardiovascular: Negative.    Gastrointestinal: Negative.    Genitourinary: Negative.    Musculoskeletal: Negative.    Integumentary:  Negative.   Neurological: Negative.    Psychiatric/Behavioral: Negative.     All other systems reviewed and are negative.      Objective:      Physical Exam  Constitutional:       Appearance: He is well-developed. He is obese.      Comments: BMI  36.03 Needs to get his weight down to  under 210  Same that he weighed when he graduated from high school   HENT:      Head: Normocephalic and atraumatic.      Right Ear: External ear normal.      Left Ear: External ear normal.   Eyes:      Conjunctiva/sclera: Conjunctivae normal.      Pupils: Pupils are equal, round, and reactive to light.   Cardiovascular:      Rate and Rhythm: Normal rate and regular rhythm.      Heart sounds: Normal heart sounds.   Pulmonary:      Effort: Pulmonary effort is normal.      Breath sounds: Normal breath sounds.   Abdominal:      General: Bowel sounds are normal.      Palpations: Abdomen is soft.   Musculoskeletal:         General: Normal range of motion.      Cervical back: Normal range of motion and neck supple.   Skin:     General: Skin is warm and dry.   Neurological:      Mental Status: He is alert and oriented to person, place, and time.      Deep Tendon Reflexes: Reflexes are normal and symmetric.   Psychiatric:         Behavior:  Behavior normal.         Thought Content: Thought content normal.         Judgment: Judgment normal.       Assessment:       Problem List Items Addressed This Visit    None  Visit Diagnoses       Annual physical exam    -  Primary              Plan:           Labs;Glucose: 125  should be <110 Will follow his weight. All other parameters are normal.     EKG: Normal    IMP: Healthy but overweight.

## 2023-11-27 DIAGNOSIS — Z00.00 ROUTINE MEDICAL EXAM: Primary | ICD-10-CM

## 2023-12-14 ENCOUNTER — CLINICAL SUPPORT (OUTPATIENT)
Dept: INTERNAL MEDICINE | Facility: CLINIC | Age: 43
End: 2023-12-14

## 2023-12-14 ENCOUNTER — OFFICE VISIT (OUTPATIENT)
Dept: INTERNAL MEDICINE | Facility: CLINIC | Age: 43
End: 2023-12-14
Payer: COMMERCIAL

## 2023-12-14 ENCOUNTER — HOSPITAL ENCOUNTER (OUTPATIENT)
Dept: CARDIOLOGY | Facility: CLINIC | Age: 43
Discharge: HOME | End: 2023-12-14

## 2023-12-14 VITALS
HEART RATE: 73 BPM | WEIGHT: 246.94 LBS | SYSTOLIC BLOOD PRESSURE: 137 MMHG | DIASTOLIC BLOOD PRESSURE: 86 MMHG | HEIGHT: 69 IN | BODY MASS INDEX: 36.57 KG/M2

## 2023-12-14 DIAGNOSIS — Z00.00 ANNUAL PHYSICAL EXAM: ICD-10-CM

## 2023-12-14 DIAGNOSIS — Z00.00 ANNUAL PHYSICAL EXAM: Primary | ICD-10-CM

## 2023-12-14 DIAGNOSIS — E66.09 CLASS 2 OBESITY DUE TO EXCESS CALORIES WITHOUT SERIOUS COMORBIDITY WITH BODY MASS INDEX (BMI) OF 36.0 TO 36.9 IN ADULT: ICD-10-CM

## 2023-12-14 DIAGNOSIS — Z00.00 HEALTHCARE MAINTENANCE: Primary | ICD-10-CM

## 2023-12-14 DIAGNOSIS — Z00.00 ROUTINE GENERAL MEDICAL EXAMINATION AT A HEALTH CARE FACILITY: Primary | ICD-10-CM

## 2023-12-14 DIAGNOSIS — Z00.00 ROUTINE MEDICAL EXAM: ICD-10-CM

## 2023-12-14 PROBLEM — E66.9 CLASS 2 OBESITY WITHOUT SERIOUS COMORBIDITY WITH BODY MASS INDEX (BMI) OF 36.0 TO 36.9 IN ADULT: Status: ACTIVE | Noted: 2023-12-14

## 2023-12-14 PROBLEM — E66.812 CLASS 2 OBESITY WITHOUT SERIOUS COMORBIDITY WITH BODY MASS INDEX (BMI) OF 36.0 TO 36.9 IN ADULT: Status: ACTIVE | Noted: 2023-12-14

## 2023-12-14 LAB
ALBUMIN SERPL BCP-MCNC: 4.3 G/DL (ref 3.5–5.2)
ALP SERPL-CCNC: 52 U/L (ref 55–135)
ALT SERPL W/O P-5'-P-CCNC: 28 U/L (ref 10–44)
ANION GAP SERPL CALC-SCNC: 11 MMOL/L (ref 8–16)
AST SERPL-CCNC: 19 U/L (ref 10–40)
BILIRUB SERPL-MCNC: 0.7 MG/DL (ref 0.1–1)
BUN SERPL-MCNC: 21 MG/DL (ref 6–20)
CALCIUM SERPL-MCNC: 9.5 MG/DL (ref 8.7–10.5)
CHLORIDE SERPL-SCNC: 107 MMOL/L (ref 95–110)
CHOLEST SERPL-MCNC: 187 MG/DL (ref 120–199)
CHOLEST/HDLC SERPL: 3 {RATIO} (ref 2–5)
CO2 SERPL-SCNC: 28 MMOL/L (ref 23–29)
CREAT SERPL-MCNC: 1 MG/DL (ref 0.5–1.4)
ERYTHROCYTE [DISTWIDTH] IN BLOOD BY AUTOMATED COUNT: 11.6 % (ref 11.5–14.5)
EST. GFR  (NO RACE VARIABLE): >60 ML/MIN/1.73 M^2
ESTIMATED AVG GLUCOSE: 114 MG/DL (ref 68–131)
GLUCOSE SERPL-MCNC: 118 MG/DL (ref 70–110)
HBA1C MFR BLD: 5.6 % (ref 4–5.6)
HCT VFR BLD AUTO: 45.8 % (ref 40–54)
HDLC SERPL-MCNC: 62 MG/DL (ref 40–75)
HDLC SERPL: 33.2 % (ref 20–50)
HGB BLD-MCNC: 15.2 G/DL (ref 14–18)
LDLC SERPL CALC-MCNC: 108.4 MG/DL (ref 63–159)
MCH RBC QN AUTO: 29.5 PG (ref 27–31)
MCHC RBC AUTO-ENTMCNC: 33.2 G/DL (ref 32–36)
MCV RBC AUTO: 89 FL (ref 82–98)
NONHDLC SERPL-MCNC: 125 MG/DL
PLATELET # BLD AUTO: 233 K/UL (ref 150–450)
PMV BLD AUTO: 11 FL (ref 9.2–12.9)
POTASSIUM SERPL-SCNC: 5.5 MMOL/L (ref 3.5–5.1)
PROT SERPL-MCNC: 7.6 G/DL (ref 6–8.4)
RBC # BLD AUTO: 5.16 M/UL (ref 4.6–6.2)
SODIUM SERPL-SCNC: 146 MMOL/L (ref 136–145)
TRIGL SERPL-MCNC: 83 MG/DL (ref 30–150)
TSH SERPL DL<=0.005 MIU/L-ACNC: 2.05 UIU/ML (ref 0.4–4)
WBC # BLD AUTO: 5.58 K/UL (ref 3.9–12.7)

## 2023-12-14 PROCEDURE — 36415 COLL VENOUS BLD VENIPUNCTURE: CPT | Performed by: EMERGENCY MEDICINE

## 2023-12-14 PROCEDURE — 99999 PR PBB SHADOW E&M-EST. PATIENT-LVL I: CPT | Mod: PBBFAC,,,

## 2023-12-14 PROCEDURE — 84443 ASSAY THYROID STIM HORMONE: CPT | Performed by: EMERGENCY MEDICINE

## 2023-12-14 PROCEDURE — 99999 PR PBB SHADOW E&M-EST. PATIENT-LVL III: CPT | Mod: PBBFAC,,, | Performed by: EMERGENCY MEDICINE

## 2023-12-14 PROCEDURE — 90471 FLU VACCINE (QUAD) GREATER THAN OR EQUAL TO 3YO PRESERVATIVE FREE IM: ICD-10-PCS | Mod: S$GLB,,, | Performed by: EMERGENCY MEDICINE

## 2023-12-14 PROCEDURE — 99386 PREV VISIT NEW AGE 40-64: CPT | Mod: S$GLB,,, | Performed by: EMERGENCY MEDICINE

## 2023-12-14 PROCEDURE — 85027 COMPLETE CBC AUTOMATED: CPT | Performed by: EMERGENCY MEDICINE

## 2023-12-14 PROCEDURE — 90471 IMMUNIZATION ADMIN: CPT | Mod: ,,, | Performed by: EMERGENCY MEDICINE

## 2023-12-14 PROCEDURE — 99999 PR PBB SHADOW E&M-EST. PATIENT-LVL I: ICD-10-PCS | Mod: PBBFAC,,,

## 2023-12-14 PROCEDURE — 99386 PR PREVENTIVE VISIT,NEW,40-64: ICD-10-PCS | Mod: S$GLB,,, | Performed by: EMERGENCY MEDICINE

## 2023-12-14 PROCEDURE — 93010 ELECTROCARDIOGRAM REPORT: CPT | Mod: S$GLB,,, | Performed by: INTERNAL MEDICINE

## 2023-12-14 PROCEDURE — 80053 COMPREHEN METABOLIC PANEL: CPT | Performed by: EMERGENCY MEDICINE

## 2023-12-14 PROCEDURE — 90715 TDAP VACCINE GREATER THAN OR EQUAL TO 7YO IM: ICD-10-PCS | Mod: S$GLB,,, | Performed by: EMERGENCY MEDICINE

## 2023-12-14 PROCEDURE — 93010 EKG 12-LEAD: ICD-10-PCS | Mod: S$GLB,,, | Performed by: INTERNAL MEDICINE

## 2023-12-14 PROCEDURE — 90715 TDAP VACCINE 7 YRS/> IM: CPT | Mod: S$GLB,,, | Performed by: EMERGENCY MEDICINE

## 2023-12-14 PROCEDURE — 90472 IMMUNIZATION ADMIN EACH ADD: CPT | Mod: S$GLB,,, | Performed by: EMERGENCY MEDICINE

## 2023-12-14 PROCEDURE — 97802 MEDICAL NUTRITION INDIV IN: CPT | Mod: S$GLB,,,

## 2023-12-14 PROCEDURE — 90686 IIV4 VACC NO PRSV 0.5 ML IM: CPT | Mod: S$GLB,,, | Performed by: EMERGENCY MEDICINE

## 2023-12-14 PROCEDURE — 93005 EKG 12-LEAD: ICD-10-PCS | Mod: S$GLB,,, | Performed by: EMERGENCY MEDICINE

## 2023-12-14 PROCEDURE — 83036 HEMOGLOBIN GLYCOSYLATED A1C: CPT | Performed by: EMERGENCY MEDICINE

## 2023-12-14 PROCEDURE — 99999 PR PBB SHADOW E&M-EST. PATIENT-LVL III: ICD-10-PCS | Mod: PBBFAC,,, | Performed by: EMERGENCY MEDICINE

## 2023-12-14 PROCEDURE — 93005 ELECTROCARDIOGRAM TRACING: CPT | Mod: S$GLB,,, | Performed by: EMERGENCY MEDICINE

## 2023-12-14 PROCEDURE — 80061 LIPID PANEL: CPT | Performed by: EMERGENCY MEDICINE

## 2023-12-14 PROCEDURE — 90471 IMMUNIZATION ADMIN: CPT | Mod: S$GLB,,, | Performed by: EMERGENCY MEDICINE

## 2023-12-14 PROCEDURE — 90472 PR IMMUNIZ,ADMIN,EACH ADDL: ICD-10-PCS | Mod: ,,, | Performed by: EMERGENCY MEDICINE

## 2023-12-14 PROCEDURE — 97802 PR MED NUTR THER, 1ST, INDIV, EA 15 MIN: ICD-10-PCS | Mod: S$GLB,,,

## 2023-12-14 PROCEDURE — 90686 FLU VACCINE (QUAD) GREATER THAN OR EQUAL TO 3YO PRESERVATIVE FREE IM: ICD-10-PCS | Mod: S$GLB,,, | Performed by: EMERGENCY MEDICINE

## 2023-12-14 PROCEDURE — 90472 IMMUNIZATION ADMIN EACH ADD: CPT | Mod: ,,, | Performed by: EMERGENCY MEDICINE

## 2023-12-14 NOTE — PROGRESS NOTES
"Pt. Has no significant cardiovascular or pulmonary history.    Physical Limitations:  Patient denied any limitations to physical activity.      Current exercise routine:  Patient does not follow any formal exercise or flexibility routine at the current time.    Goals:  Patient set a long term goal weight of 220 lbs and agreed to an initial goal weight of 235 lbs.    Notes:  Patient was very friendly and engaged.  Patient keeps active at work and regularly reaches over 10,000 steps per day.  He has a 9 and 10 year old at home who are getting bikes for [employer] and patient plans to ride his bike with them.  He is a former  but has been out of a regular exercise routine since 2020.  He has lost weight and gotten down to 129 lbs following "crash diets" but is looking for more sustainable outcomes.  Patient asked questions and was receptive to all recommendations made.      The fitness evaluation results are as follows:    D.O.S. 12/14/2023 11/96/2022 11/19/2019 11/13/2018   Height (in): 69 69 68.5 68.5   Weight (lbs): 245.5 242.3 249 247   BMI: 36.053505 35.837357 37.5263817 37.9246612   Body Fat (%): 33.60 34.10 30.69 28.73   Waist (cm): 108 110 107 109   RBP (mmHg): 134/96 134/90 140/94 116/84   RHR (bpm): 66 80 76 74    Strength Dominant (Lbs): 130 135 130 135    Strength Non Dominant (Lbs): 138 140 140 [phone]   Push-up Assessment: 34 31 48 45   Curl-up Assessment: 23 27 14 0   Flexibility Testing (cm): 28 29 27 29   REE (kcals): 1967 8923 4915 1820       Age/gender stratified assessment:    Resting BP: Elevated   Body Fat %: Poor   Waist Circumfernece: High Risk    Strength Dominant: 75th    Strength Non Dominant: 90th   Upper Body Endurance: Excellent   Abdominal Endurance: Below Average   Lower body Flexibiltiy: Good       Recommended fitness guidelines:    -150 minutes of moderate intensity aerobic exercise per week or 75 minutes of vigorous intensity aerobic exercise per week.  " Try to walk or bike for 30 minutes, 5 days a week or 50 minutes, 3 days a week, in minimum bouts of 10 minutes at a time.  Incorporate some interval training to increase your heart rate for short periods of time.      -2 to 4 days per week of resistance training for each muscle group.  Practice daily push-ups.      -Daily stretching with a hold of at least 30 seconds per muscle group.

## 2023-12-14 NOTE — PROGRESS NOTES
Nutrition Assessment  Session Time:  30 minutes      Client name:  Eriberto Palomino  :  1980  Age:  42 y.o.  Gender:  male    Client states:  A very pleasant Mr. Palomino is here for his annual [facility] physical. Patient is  with a son and daughter (aged 9 and 10) and is the  for [employer]. He has no significant medical history, although his HbA1c last year was 5.6%. Over the past 5 years, his nutrition goals have always centered around weight loss, starting an exercise regimen, and drinking less beer, and he will try to implement them for a short time, but he always goes back to his previous habits and loses any progress he made. His weight has remained stable, and he would like to lose 20 pounds. Patient has some good eating habits, including having a healthy breakfast and lunch most days and not snacking in between meals. However, he and his wife work full time and spend a good deal of their time after work and on weekends taking the kids to their various sports-related activities, so they end up picking up a lot of their meals on weeknights, and they often lean toward fast food. He has tried drinking Lite beer but misses his preferred brands and ends up going back to them. He would also like to get back to gym workouts (before he had children, he worked out regularly and was very fit), but he is hesitant to go to the gym when he is so out of shape. He is very active at work and gets in 10,000 steps on most days. Encouraged him to find some type of activity he can do at home to start out with (walking, rucking, Youtube calisthetics workout, etc) until he is ready to go back to the gym--he gets up very early so mornings would be his best workout time. His father has always worked out and is very fit, and this is a good model for him. Educated patient on benefits of adding fiber to his diet and we discussed how he could add some fiber to his meals, even when he is eating  "out, to better manage his blood glucose. Encouraged him not to view his journey as an "all or nothing" process, but rather a series of very small steps.    Anthropometrics  Height:  69"     Weight:  245.5 lbs  BMI:  36.3  % Body Fat:  33.6    Clinical Signs/Symptoms  N/V/D:  none  Appetite:  good       No past medical history on file.    Past Surgical History:   Procedure Laterality Date    VASECTOMY         Medications    currently has no medications in their medication list.    Vitamins, Minerals, and/or Supplements:  none     Food/Medication Interactions:  Reviewed     Food Allergies or Intolerances:  none     Social History    Marital status:    Employment:  [employer]    Social History     Tobacco Use    Smoking status: Former     Current packs/day: 0.00     Average packs/day: 1 pack/day for 5.0 years (5.0 ttl pk-yrs)     Types: Cigarettes     Start date: 1999     Quit date: 2004     Years since quittin.6     Passive exposure: Past    Smokeless tobacco: Never   Substance Use Topics    Alcohol use: Yes     Alcohol/week: 8.0 standard drinks of alcohol     Types: 8 Cans of beer per week        Lab Reports   Sodium   Date Value Ref Range Status   2023 146 (H) 136 - 145 mmol/L Final     Potassium   Date Value Ref Range Status   2023 5.5 (H) 3.5 - 5.1 mmol/L Final     Comment:     *No Visible Hemolysis     Chloride   Date Value Ref Range Status   2023 107 95 - 110 mmol/L Final     CO2   Date Value Ref Range Status   2023 28 23 - 29 mmol/L Final     Glucose   Date Value Ref Range Status   2023 118 (H) 70 - 110 mg/dL Final     BUN   Date Value Ref Range Status   2023 21 (H) 6 - 20 mg/dL Final     Creatinine   Date Value Ref Range Status   2023 1.0 0.5 - 1.4 mg/dL Final     Calcium   Date Value Ref Range Status   2023 9.5 8.7 - 10.5 mg/dL Final     Total Protein   Date Value Ref Range Status   2023 7.6 6.0 - 8.4 g/dL Final     Albumin "   Date Value Ref Range Status   12/14/2023 4.3 3.5 - 5.2 g/dL Final     Total Bilirubin   Date Value Ref Range Status   12/14/2023 0.7 0.1 - 1.0 mg/dL Final     Comment:     For infants and newborns, interpretation of results should be based  on gestational age, weight and in agreement with clinical  observations.    Premature Infant recommended reference ranges:  Up to 24 hours.............<8.0 mg/dL  Up to 48 hours............<12.0 mg/dL  3-5 days..................<15.0 mg/dL  6-29 days.................<15.0 mg/dL       Alkaline Phosphatase   Date Value Ref Range Status   12/14/2023 52 (L) 55 - 135 U/L Final     AST   Date Value Ref Range Status   12/14/2023 19 10 - 40 U/L Final     ALT   Date Value Ref Range Status   12/14/2023 28 10 - 44 U/L Final     Anion Gap   Date Value Ref Range Status   12/14/2023 11 8 - 16 mmol/L Final     eGFR if    Date Value Ref Range Status   11/19/2019 >60.0 >60 mL/min/1.73 m^2 Final     eGFR if non    Date Value Ref Range Status   11/19/2019 >60.0 >60 mL/min/1.73 m^2 Final     Comment:     Calculation used to obtain the estimated glomerular filtration  rate (eGFR) is the CKD-EPI equation.         Lab Results   Component Value Date    WBC 5.58 12/14/2023    HGB 15.2 12/14/2023    HCT 45.8 12/14/2023    MCV 89 12/14/2023     12/14/2023        Lab Results   Component Value Date    CHOL 187 12/14/2023     Lab Results   Component Value Date    HDL 62 12/14/2023     Lab Results   Component Value Date    LDLCALC 108.4 12/14/2023     Lab Results   Component Value Date    TRIG 83 12/14/2023     Lab Results   Component Value Date    CHOLHDL 33.2 12/14/2023     Lab Results   Component Value Date    HGBA1C 5.6 12/14/2023     BP Readings from Last 1 Encounters:   12/14/23 137/86       Food History  Breakfast:  2 cups coffee with skim milk, Sweet & Low packet; a packet of instant oatmeal and a few slices deli turkey  Mid-morning Snack:  none  Lunch: turkey  or ham sandwich with zheng on wheat bread, sometimes with some nuts or a piece of fruit  Mid-afternoon Snack:  none  Dinner:  usually out: Pizza Hut pizza OR Frisian food OR Hibachi salmon  Makes meals at home a few nights a week: red beans & rice OR steak/chicken with vegetables (steamed broccoli, cauliflower)  Snack:  none  *Fluid intake:  coffee, water  Alcohol: 12-14 beers a week, mostly on weekends but sometimes will have 3 beers on a weeknight    Exercise History:  Patient regularly gets in 10,000 steps at work. Patient does not have any structured exercise at this time.    Cultural/Spiritual/Personal Preferences:  None identified    Support System:  spouse, children, and parents    State of Change:  Contemplation    Barriers to Change:  pride, very busy schedule at work and with kids    Diagnosis    Other: Obesity  related to inactivity and processed food and alcohol intake as evidenced by BMI 36, patient drinks 12-14 beers per week and eats fast food regularly.    Intervention    RMR (Method:  InBody):  1967 kcal  Activity Factor:  1.3    ROWENA:  2557 kcal    Goals:  1.  Limit beers to 8 beers per week.  2.  Consider finding some type of exercise to do in the mornings for 20-30 minutes for 3-4 days a week.    Nutrition Education  The following education was provided to the patient:  Complimented patient on proactive role in health maintenance.  Discussed weight management.  Suggested dietary modifications based on current dietary behaviors and individual food preferences.  *Lab results were pending at time of consult and so, not discussed with patient.  Discussed alcohol consumption (potential health consequences of excessive alcohol intake, recommended intake for men and women, recommended serving sizes, ways to reduce and/or moderate intake, etc.).  Discussed recommended fiber intake and food sources of such.  Discussed importance of small behavior/habit changes in improving long-term adherence and  sustainability.  Discussed goal setting.  Provided ongoing support, encouragement, and guidance toward improved health efforts.    Patient verbalized understanding of nutrition education and recommendations received.    Handouts Provided  Meal Planning Guide  Eat Fit Shopping List  Eat Fit Tabatha  Fueling Well On-The-Go    Monitoring/Evaluation    Monitor the following:  Weight  BMI  % Body Fat  Caloric intake  Labs:  HbA1c    Follow Up Plan:  Communication with referring healthcare provider is unnecessary at this time as patient presented as part of annual wellness exam.  However, will follow up with patient in 1-2 years.

## 2023-12-14 NOTE — PROGRESS NOTES
[facility]      TODAY'S Date 12/15/2023  Patient ID: Eriberto Palomino is a 42 y.o. male   MRN: [id number]  Primary Physician: Naveen Gonzalez MD    SUBJECTIVE     Chief Complaint:   Chief Complaint   Patient presents with    [facility]     HPI:   Reviewed medical, surgical, social and family history, medications, appropriate preventive health screenings, as well as vaccination history. Updates as noted below or in assessment and plan.    This is a very pleasant 42 y.o. male with no significant PMHx who is new patient to me presenting for an annual exam in [facility].  Patient is currently in a normal state of health. His goal for 2024 is lose weight. He owns an Apple watch and tries to walk the yard.  He works for [employer]. Otherwise he gets little exercise.  He has been  for 12 years. He and his wife 2 children, ages 10 and 9. Six days a week, he attends his kids sporting events such as travel baseball.  He plans on going biking with his daughter more and getting more exercise at his local gym. For breakfast, he typically has 1-2 cups of coffee, oatmeal, deli meat out the package or as a sandwich. For lunch, he eats another deli meat sandwich or goes out for Mexican or Panamanian food. Dinner consists of a home cooked meal made of grilled beef or chicken.  He rarely has vegetables and snacks on sea salt nuts. The patient drinks 10 -12 beers weekly. He goes to bed at 9pm and wakes up around 4 or 5 am.  Sometimes he wakes up thinking about work.  If he cannot, get back to sleep he does housework. He is open to receiving Tdap and the influenza vaccine but declines the COVID booster at this time. There is no report of any mood changes, arthralgias, myalgias, cough, sneezing, tingling, weakness, clumsiness, numbness or loss of bowel or bladder control.    SCREENING:   Prostate:                    n/a as < 50 years old and no family history  Colonoscopy:               n/a as < 45 years old and no family history  Depression:                PHQ-9 RESULT SUMMARY: 2 points Scores ?4 suggest minimal depression   Anxiety:  ULISES-7 RESULT SUMMARY: 7 points indicates Mild anxiety disorder.  Eye Exam:                UTD in , wear corrective lens, plans to go in   Dental Exam:            Routine q 6 months. Next in 2024  Skin:                         Routinely uses sunscreen  Sex:                          Monogamous relationship, contraception by vasectomy  Transportation safety:  Uses restraint consistently, does not drink alcohol before or while driving  Tobacco use:            none    A review of medical records indicates patient saw Ophthalmology (Manoj Matute IV) in  for myopia and allergic conjunctivitis        Immunization History   Administered Date(s) Administered    COVID-19, MRNA, LN-S, PF (Pfizer) (Purple Cap) 2021, 08/10/2021    Influenza - Quadrivalent - PF *Preferred* (6 months and older) 2019, 2023    Tdap 2023     Past Medical History:   Diagnosis Date    Known health problems: none      Past Surgical History:   Procedure Laterality Date    VASECTOMY       Family History   Problem Relation Age of Onset    Hypertension Father     Emphysema Paternal Grandmother      Social History     Tobacco Use    Smoking status: Former     Current packs/day: 0.00     Average packs/day: 1 pack/day for 5.0 years (5.0 ttl pk-yrs)     Types: Cigarettes     Start date: 1999     Quit date: 2004     Years since quittin.6     Passive exposure: Past    Smokeless tobacco: Never   Substance Use Topics    Alcohol use: Yes     Alcohol/week: 8.0 standard drinks of alcohol     Types: 8 Cans of beer per week    Drug use: No     Past Medical, Surgical and Social history reviewed and verified by me.     Review of patient's allergies indicates:  No Known Allergies    No current outpatient medications on file prior to visit.     No  "current facility-administered medications on file prior to visit.       Review of Systems as per HPI  ROS  Constitutional: Negative for activity change, appetite change, fatigue, diaphoresis, chills and fever.   Respiratory: Negative for apnea, choking, chest tightness and wheezing.  Genitourinary: Negative for hematuria, flank pain, frequency and dysuria.   Skin: Negative for color change, pallor, rash and wound.   Psychiatric/Behavioral: Negative for agitation, behavioral problems, confusion, decreased concentration and dysphoric mood.     All other systems reviewed and are negative.    OBJECTIVE     PHYSICAL EXAM  Vitals:    12/14/23 0933   BP: 137/86   Pulse: 73   Weight: 112 kg (246 lb 14.6 oz)   Height: 5' 9" (1.753 m)     Vital Signs (Most Recent):  Pulse: 73 (12/14/23 0933)  BP: 137/86 (12/14/23 0933)   Weight:   Wt Readings from Last 1 Encounters:   12/14/23 112 kg (246 lb 14.6 oz)     Body mass index is 36.46 kg/m².      Vital signs and nursing assessment noted: mildly elevated SBP    GEN:   NAD, A & Ox3, atraumatic, well appearing, nontoxic appearing, overweight  HEENT:  PERRLA, EOMI, moist membranes, nl conjunctiva, no scleral icterus, no nystagmus, no nodes/nodules, soft, supple, FROM, no trachial deviation, nexus negative  CV:   RRR no m/r/g, 2+ radial pulses, <2sec cap refill, no obvious JVD  RESP:  CTA B, no w/r/r, equal and bilateral chest rise, no respiratory distress  ABD:   soft, Nontender, Nondistended, +BS, no guarding/rebound  :   Deferred  BACK:  FROM, no midline tenderness, no paraspinal tenderness  EXT:   FROM, MARCELO x 4, no swelling, no edema, no calf tenderness, no bony tenderness, no warmth or redness, no crepitus, no obvious deformity  LYMPH:  no gross adenopathy  NEURO:  GCS 15, CN II-XII grossly intact, no obvious motor/sensory deficit, no tremor, negative Romberg,  nl gait/coordination  PSYCH:   no SI/HI, no anxiety, nl mood/affect, nl judgement/thought process  SKIN:  Warm, dry, " intact, no rashes/lesions or masses, nl color, no pallor      Tests    EKG  Reviewed and independently interpreted:  No STEMI  NSR with HR 81  Nl conduction, nl intervals  Nl ST and T wave   No ectopy, Nl axis    Labs and imaging studies reviewed and independently interpreted   Recent Results (from the past 2016 hour(s))   Comprehensive metabolic panel    Collection Time: 12/14/23  7:37 AM   Result Value Ref Range    Sodium 146 (H) 136 - 145 mmol/L    Potassium 5.5 (H) 3.5 - 5.1 mmol/L    Chloride 107 95 - 110 mmol/L    CO2 28 23 - 29 mmol/L    Glucose 118 (H) 70 - 110 mg/dL    BUN 21 (H) 6 - 20 mg/dL    Creatinine 1.0 0.5 - 1.4 mg/dL    Calcium 9.5 8.7 - 10.5 mg/dL    Total Protein 7.6 6.0 - 8.4 g/dL    Albumin 4.3 3.5 - 5.2 g/dL    Total Bilirubin 0.7 0.1 - 1.0 mg/dL    Alkaline Phosphatase 52 (L) 55 - 135 U/L    AST 19 10 - 40 U/L    ALT 28 10 - 44 U/L    eGFR >60.0 >60 mL/min/1.73 m^2    Anion Gap 11 8 - 16 mmol/L   CBC Without Differential    Collection Time: 12/14/23  7:37 AM   Result Value Ref Range    WBC 5.58 3.90 - 12.70 K/uL    RBC 5.16 4.60 - 6.20 M/uL    Hemoglobin 15.2 14.0 - 18.0 g/dL    Hematocrit 45.8 40.0 - 54.0 %    MCV 89 82 - 98 fL    MCH 29.5 27.0 - 31.0 pg    MCHC 33.2 32.0 - 36.0 g/dL    RDW 11.6 11.5 - 14.5 %    Platelets 233 150 - 450 K/uL    MPV 11.0 9.2 - 12.9 fL   Lipid panel    Collection Time: 12/14/23  7:37 AM   Result Value Ref Range    Cholesterol 187 120 - 199 mg/dL    Triglycerides 83 30 - 150 mg/dL    HDL 62 40 - 75 mg/dL    LDL Cholesterol 108.4 63.0 - 159.0 mg/dL    HDL/Cholesterol Ratio 33.2 20.0 - 50.0 %    Total Cholesterol/HDL Ratio 3.0 2.0 - 5.0    Non-HDL Cholesterol 125 mg/dL   TSH    Collection Time: 12/14/23  7:37 AM   Result Value Ref Range    TSH 2.049 0.400 - 4.000 uIU/mL   Hemoglobin A1c    Collection Time: 12/14/23  7:37 AM   Result Value Ref Range    Hemoglobin A1C 5.6 4.0 - 5.6 %    Estimated Avg Glucose 114 68 - 131 mg/dL       Latest Reference Range & Units  11/09/22 07:46   HIV 1/2 Ag/Ab Non-reactive  Non-reactive     CXR Nov 2019 Impression:  No significant intrathoracic abnormality.  No significant detrimental interval change in the appearance of the chest since 11/13/2018 is appreciated.    CT chest May 2014 Impression  In the posterior basal segment of the left lower lobe there is region of soft tissue consolidation with volume loss producing crowding of subsegmental airways.    The periphery of this abnormality has bandlike configuration.    We detect no abnormal vascular supply to this area on this limited noncontrast examination.   ASSESSMENT:     1. Healthcare maintenance    2. Annual physical exam    3. Class 2 obesity due to excess calories without serious comorbidity with body mass index (BMI) of 36.0 to 36.9 in adult       42 y.o. male with no significant PMHx presents for an annual exam in [facility]. He is without complaints outside of stress at work and finding time to eat right and work out. Functionally, the patient does not report limitations due to their symptoms. Home medications reviewed.  Patient reports as none. Exam is relatively benign except for hyperpigmented papule at L base neck.  Immunization status: missing doses of Tdap and influenza. Scheduled cancer screenings completed. ULISES-7 RESULT SUMMARY: 7 points indicates Mild anxiety disorder. PHQ-9 RESULT SUMMARY: 2 points Scores ?4 suggest minimal depression which may not require treatment. The 10-year ASCVD risk score (Alfredo ALMANZA, et al., 2019) is: 0.9%    MDM  Reviewed: previous chart, nursing note and vitals  Reviewed previous: labs, ECG, x-ray and CT scan  Interpretation: labs, ECG and x-ray (elevated for mildly elevated potassium otherwise relatively unremarkable CMP, CBC, Lipid Panel, HgA1c, TSH. CXR Nov 2019 NAD)  Consults: primary care provider (Dr. Naveen Gonzaelz updated on lab results)        PLAN:    We suggest periodic health maintenance visits annually or sooner with concerns.    Routine labs CMP, CBC, Lipid, HgA1C, TSH. Consider Hep C Ab  Immunization status: up to date and documented.     Orders Placed This Encounter    (In Office Administered) Tdap Vaccine    Influenza - Quadrivalent *Preferred* (6 months+) (PF)    HEPATITIS C ANTIBODY    Lipid-lowering therapy was not prescribed due to medical contraindication.  Encouraged healthy eating, exercise and weight management.   Recommend 150 minutes of moderate-intensity physical activity and 2 days of muscle strengthening activity weekly.  Recommend daily sun protection/avoidance, use of at least SPF 30, broad spectrum sunscreen (OTC drug), and routine physician surveillance to optimize early detection.    The results of physical exam findings, labs, and imaging were reviewed with the patient. Management of above assessments/visit diagnoses was discussed with patient.   Precautions for return discussed at length. Patient was given ample time for questions. All questions asked and answered to the satisfaction of the patient. Patient is in agreement with the above and verbalized understanding. Total time spent caring for the patient today was 30 minutes. This includes time spent before the visit reviewing the chart, time spent during the visit, and time spent after the visit on documentation.       Agatha Rahman MD  [facility]

## 2024-07-18 ENCOUNTER — OFFICE VISIT (OUTPATIENT)
Dept: URGENT CARE | Facility: CLINIC | Age: 44
End: 2024-07-18
Payer: COMMERCIAL

## 2024-07-18 VITALS
WEIGHT: 242 LBS | HEIGHT: 69 IN | OXYGEN SATURATION: 97 % | BODY MASS INDEX: 35.84 KG/M2 | RESPIRATION RATE: 18 BRPM | SYSTOLIC BLOOD PRESSURE: 134 MMHG | TEMPERATURE: 101 F | HEART RATE: 109 BPM | DIASTOLIC BLOOD PRESSURE: 95 MMHG

## 2024-07-18 DIAGNOSIS — R05.9 COUGH, UNSPECIFIED TYPE: ICD-10-CM

## 2024-07-18 DIAGNOSIS — J06.9 VIRAL URI WITH COUGH: Primary | ICD-10-CM

## 2024-07-18 DIAGNOSIS — R50.9 FEVER, UNSPECIFIED FEVER CAUSE: ICD-10-CM

## 2024-07-18 LAB
CTP QC/QA: YES
CTP QC/QA: YES
POC MOLECULAR INFLUENZA A AGN: NEGATIVE
POC MOLECULAR INFLUENZA B AGN: NEGATIVE
SARS-COV-2 AG RESP QL IA.RAPID: NEGATIVE

## 2024-07-18 PROCEDURE — 87502 INFLUENZA DNA AMP PROBE: CPT | Mod: QW,S$GLB,, | Performed by: NURSE PRACTITIONER

## 2024-07-18 PROCEDURE — 87811 SARS-COV-2 COVID19 W/OPTIC: CPT | Mod: QW,S$GLB,, | Performed by: NURSE PRACTITIONER

## 2024-07-18 PROCEDURE — 99214 OFFICE O/P EST MOD 30 MIN: CPT | Mod: S$GLB,,, | Performed by: NURSE PRACTITIONER

## 2024-07-18 RX ORDER — ACETAMINOPHEN 500 MG
1000 TABLET ORAL
Status: COMPLETED | OUTPATIENT
Start: 2024-07-18 | End: 2024-07-18

## 2024-07-18 RX ADMIN — Medication 1000 MG: at 09:07

## 2024-07-18 NOTE — PROGRESS NOTES
"Subjective:      Patient ID: Eriberto Palomino is a 43 y.o. male.    Vitals:  height is 5' 9" (1.753 m) and weight is 109.8 kg (242 lb). His temperature is 100.9 °F (38.3 °C) (abnormal). His blood pressure is 134/95 (abnormal) and his pulse is 109. His respiration is 18 and oxygen saturation is 97%.     Chief Complaint: Cough    This is a 43 y.o. male who presents today with a chief complaint of   Cough. Associated symptoms include fever, nasal congestion, and headaches. Symptoms began yesterday and have gradually worsened since. Pt recorded a fever of 101 this morning. Pt took delsym for cough and has experienced moderate relief. Pt has children that both had strep 2-3 weeks ago and some coworkers have gone home sick recently (unspecified sickness). Pt has a past medical history of pneumonia.     Cough  This is a new problem. The current episode started yesterday. The problem has been gradually worsening. The cough is Non-productive. Associated symptoms include a fever, headaches, myalgias, nasal congestion and rhinorrhea. Pertinent negatives include no chest pain, chills, ear congestion, ear pain, heartburn, hemoptysis, postnasal drip, rash, sore throat, shortness of breath, sweats, weight loss or wheezing. Associated symptoms comments: Fever of 101. Nothing aggravates the symptoms. Risk factors: children had strep 2-3 weeks ago, and possible covid exposure at work. Treatments tried: delsyum. The treatment provided moderate relief. His past medical history is significant for pneumonia. There is no history of asthma, bronchitis or emphysema. 2014       Constitution: Positive for fever. Negative for chills and fatigue.   HENT:  Positive for congestion. Negative for ear pain, postnasal drip and sore throat.    Cardiovascular:  Negative for chest pain.   Respiratory:  Positive for cough. Negative for chest tightness, sputum production, bloody sputum, shortness of breath and wheezing.    Gastrointestinal:  Negative for " heartburn.   Musculoskeletal:  Positive for muscle ache.   Skin:  Negative for rash.   Neurological:  Positive for headaches.      Objective:     Physical Exam   Constitutional: He is oriented to person, place, and time. He appears well-developed. He is cooperative.  Non-toxic appearance. He does not appear ill. No distress.   HENT:   Head: Normocephalic and atraumatic.   Ears:   Right Ear: Hearing, tympanic membrane, external ear and ear canal normal.   Left Ear: Hearing, tympanic membrane, external ear and ear canal normal.   Nose: Nose normal. No mucosal edema, rhinorrhea or nasal deformity. No epistaxis. Right sinus exhibits no maxillary sinus tenderness and no frontal sinus tenderness. Left sinus exhibits no maxillary sinus tenderness and no frontal sinus tenderness.   Mouth/Throat: Uvula is midline, oropharynx is clear and moist and mucous membranes are normal. No trismus in the jaw. Normal dentition. No uvula swelling. No oropharyngeal exudate, posterior oropharyngeal edema or posterior oropharyngeal erythema. Tonsils are 1+ on the right. Tonsils are 1+ on the left. No tonsillar exudate.   Eyes: Conjunctivae and lids are normal. No scleral icterus.   Neck: Trachea normal and phonation normal. Neck supple. No edema present. No erythema present. No neck rigidity present.   Cardiovascular: Normal rate, regular rhythm, normal heart sounds and normal pulses.   Pulmonary/Chest: Effort normal and breath sounds normal. No respiratory distress. He has no decreased breath sounds. He has no rhonchi.   Abdominal: Normal appearance.   Musculoskeletal: Normal range of motion.         General: No deformity. Normal range of motion.   Neurological: He is alert and oriented to person, place, and time. He exhibits normal muscle tone. Coordination normal.   Skin: Skin is warm, dry, intact, not diaphoretic and not pale.   Psychiatric: His speech is normal and behavior is normal. Judgment and thought content normal.   Nursing note  and vitals reviewed.      Assessment:     1. Viral URI with cough    2. Cough, unspecified type    3. Fever, unspecified fever cause        Plan:     Results for orders placed or performed in visit on 07/18/24   SARS Coronavirus 2 Antigen, POCT Manual Read   Result Value Ref Range    SARS Coronavirus 2 Antigen Negative Negative     Acceptable Yes    POCT Influenza A/B MOLECULAR   Result Value Ref Range    POC Molecular Influenza A Ag Negative Negative    POC Molecular Influenza B Ag Negative Negative     Acceptable Yes        Viral URI with cough    Cough, unspecified type  -     SARS Coronavirus 2 Antigen, POCT Manual Read  -     POCT Influenza A/B MOLECULAR    Fever, unspecified fever cause  -     POCT Influenza A/B MOLECULAR  -     acetaminophen tablet 1,000 mg      Patient Instructions   Please drink plenty of fluids.  Please get plenty of rest.  Please return here or go to the Emergency Department for any concerns or worsening of condition.  If you do not have Hypertension or any history of palpitations, it is ok to take over the counter Sudafed or Mucinex D or Allegra-D or Claritin-D or Zyrtec-D.  If you do take one of the above, it is ok to combine that with plain over the counter Mucinex or Allegra or Claritin or Zyrtec.  If for example you are taking Zyrtec -D, you can combine that with Mucinex, but not Mucinex-D.  If you are taking Mucinex-D, you can combine that with plain Allegra or Claritin or Zyrtec.   If you do have Hypertension or palpitations, it is safe to take Coricidin HBP for relief of sinus symptoms.  If not allergic, please take over the counter Tylenol (Acetaminophen) and/or Motrin (Ibuprofen) as directed for control of pain and/or fever.  Please follow up with your primary care doctor or specialist as needed.    If you  smoke, please stop smoking.

## 2024-07-18 NOTE — LETTER
July 18, 2024      [facility]  [address]  [facility] [address]  Phone: [phone]  Fax: [phone]       Patient: Eriberto Palomino   YOB: 1980  Date of Visit: 07/18/2024    To Whom It May Concern:    Vasile Palomino  was at [facility] on 07/18/2024. The patient may return to work/school on 7/22/24 with no restrictions. If you have any questions or concerns, or if I can be of further assistance, please do not hesitate to contact me.    Sincerely,            Krystin Sanders, NP

## 2024-11-13 ENCOUNTER — PATIENT MESSAGE (OUTPATIENT)
Dept: RESEARCH | Facility: HOSPITAL | Age: 44
End: 2024-11-13
Payer: COMMERCIAL

## 2024-11-25 DIAGNOSIS — Z00.00 ROUTINE MEDICAL EXAM: Primary | ICD-10-CM

## 2024-12-12 ENCOUNTER — CLINICAL SUPPORT (OUTPATIENT)
Dept: INTERNAL MEDICINE | Facility: CLINIC | Age: 44
End: 2024-12-12

## 2024-12-12 ENCOUNTER — HOSPITAL ENCOUNTER (OUTPATIENT)
Dept: CARDIOLOGY | Facility: CLINIC | Age: 44
Discharge: HOME | End: 2024-12-12

## 2024-12-12 DIAGNOSIS — Z00.00 ROUTINE GENERAL MEDICAL EXAMINATION AT A HEALTH CARE FACILITY: Primary | ICD-10-CM

## 2024-12-12 DIAGNOSIS — Z00.00 ROUTINE MEDICAL EXAM: ICD-10-CM

## 2024-12-12 LAB
ALBUMIN SERPL BCP-MCNC: 4.4 G/DL (ref 3.5–5.2)
ALP SERPL-CCNC: 55 U/L (ref 40–150)
ALT SERPL W/O P-5'-P-CCNC: 19 U/L (ref 10–44)
ANION GAP SERPL CALC-SCNC: 7 MMOL/L (ref 8–16)
AST SERPL-CCNC: 13 U/L (ref 10–40)
BILIRUB SERPL-MCNC: 0.7 MG/DL (ref 0.1–1)
BUN SERPL-MCNC: 23 MG/DL (ref 6–20)
CALCIUM SERPL-MCNC: 9.3 MG/DL (ref 8.7–10.5)
CHLORIDE SERPL-SCNC: 107 MMOL/L (ref 95–110)
CHOLEST SERPL-MCNC: 187 MG/DL (ref 120–199)
CHOLEST/HDLC SERPL: 2.8 {RATIO} (ref 2–5)
CO2 SERPL-SCNC: 26 MMOL/L (ref 23–29)
CREAT SERPL-MCNC: 1.1 MG/DL (ref 0.5–1.4)
ERYTHROCYTE [DISTWIDTH] IN BLOOD BY AUTOMATED COUNT: 11.8 % (ref 11.5–14.5)
EST. GFR  (NO RACE VARIABLE): >60 ML/MIN/1.73 M^2
ESTIMATED AVG GLUCOSE: 117 MG/DL (ref 68–131)
GLUCOSE SERPL-MCNC: 121 MG/DL (ref 70–110)
HBA1C MFR BLD: 5.7 % (ref 4–5.6)
HCT VFR BLD AUTO: 48.2 % (ref 40–54)
HDLC SERPL-MCNC: 68 MG/DL (ref 40–75)
HDLC SERPL: 36.4 % (ref 20–50)
HGB BLD-MCNC: 15.6 G/DL (ref 14–18)
LDLC SERPL CALC-MCNC: 106.8 MG/DL (ref 63–159)
MCH RBC QN AUTO: 29.7 PG (ref 27–31)
MCHC RBC AUTO-ENTMCNC: 32.4 G/DL (ref 32–36)
MCV RBC AUTO: 92 FL (ref 82–98)
NONHDLC SERPL-MCNC: 119 MG/DL
OHS QRS DURATION: 72 MS
OHS QTC CALCULATION: 420 MS
PLATELET # BLD AUTO: 226 K/UL (ref 150–450)
PMV BLD AUTO: 10.7 FL (ref 9.2–12.9)
POTASSIUM SERPL-SCNC: 4.4 MMOL/L (ref 3.5–5.1)
PROT SERPL-MCNC: 7.5 G/DL (ref 6–8.4)
RBC # BLD AUTO: 5.26 M/UL (ref 4.6–6.2)
SODIUM SERPL-SCNC: 140 MMOL/L (ref 136–145)
TRIGL SERPL-MCNC: 61 MG/DL (ref 30–150)
TSH SERPL DL<=0.005 MIU/L-ACNC: 1.63 UIU/ML (ref 0.4–4)
WBC # BLD AUTO: 5.8 K/UL (ref 3.9–12.7)

## 2024-12-12 PROCEDURE — 97750 PHYSICAL PERFORMANCE TEST: CPT | Mod: ,,, | Performed by: INTERNAL MEDICINE

## 2024-12-12 PROCEDURE — 83036 HEMOGLOBIN GLYCOSYLATED A1C: CPT | Performed by: EMERGENCY MEDICINE

## 2024-12-12 PROCEDURE — 99999 PR PBB SHADOW E&M-EST. PATIENT-LVL I: CPT | Mod: PBBFAC,,,

## 2024-12-12 PROCEDURE — 85027 COMPLETE CBC AUTOMATED: CPT | Performed by: EMERGENCY MEDICINE

## 2024-12-12 PROCEDURE — 93005 ELECTROCARDIOGRAM TRACING: CPT | Mod: ,,, | Performed by: EMERGENCY MEDICINE

## 2024-12-12 PROCEDURE — 80053 COMPREHEN METABOLIC PANEL: CPT | Performed by: EMERGENCY MEDICINE

## 2024-12-12 PROCEDURE — 99211 OFF/OP EST MAY X REQ PHY/QHP: CPT | Mod: ,,, | Performed by: INTERNAL MEDICINE

## 2024-12-12 PROCEDURE — 93010 ELECTROCARDIOGRAM REPORT: CPT | Mod: ,,, | Performed by: STUDENT IN AN ORGANIZED HEALTH CARE EDUCATION/TRAINING PROGRAM

## 2024-12-12 PROCEDURE — 99199 UNLISTED SPECIAL SVC PX/RPRT: CPT | Mod: ,,, | Performed by: INTERNAL MEDICINE

## 2024-12-12 PROCEDURE — 80061 LIPID PANEL: CPT | Performed by: EMERGENCY MEDICINE

## 2024-12-12 PROCEDURE — 97802 MEDICAL NUTRITION INDIV IN: CPT | Mod: ,,, | Performed by: DIETITIAN, REGISTERED

## 2024-12-12 PROCEDURE — 84443 ASSAY THYROID STIM HORMONE: CPT | Performed by: EMERGENCY MEDICINE

## 2024-12-12 NOTE — PROGRESS NOTES
Pt. Has no significant cardiovascular or pulmonary history.    Physical Limitations:  Patient denied any limitations to physical activity.     Current exercise routine:  Patient does not follow any formal exercise or flexibility routine at the current time.    Goals:  Patient set a goal weight of 120 lbs  Notes:  Patient was friendly and engaged.  He noted that he keeps active at work, usually getting 10,000 steps a day.  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  We discussed how to fit aerobic exercise in when he can, including at his son's practices.  We also reviewed the habit stacking method so he can easily add exercise to his established routine.  Patient asked questions and was receptive to all recommendations.      The fitness evaluation results are as follows:    D.O.S. 12/12/2024 12/14/2023 11/9/2022 11/19/2019 11/13/2018   Height (in): 69 69 69 68.5 68.5   Weight (lbs): 229.6 245.5 242.3 249 247   BMI: 33.435173 36.443839 35.584785 37.800568 37.541594   Body Fat (%): 32.50 33.60 34.10 30.69 28.73   Waist (cm): 103 108 110 107 109   RBP (mmHg): 126/88 134/96 134/90 140/94 116/84   RHR (bpm): 72 66 80 76 74    Strength Dominant (Lbs): 140 130 135 130 135    Strength Non Dominant (Lbs): 141 138 140 [phone]   Push-up Assessment: 36 34 31 48 45   Curl-up Assessment: 21 23 27 14 0   Flexibility Testing (cm): 26 28 29 27 29   REE (kcals): 1889 1967 1935 2790 1820       Age/gender stratified assessment:    Resting BP: Within Normal Limits   Body Fat %: Poor   Waist Circumfernece: High Risk    Strength Dominant: 90th    Strength Non Dominant: 90th   Upper Body Endurance: Excellent   Abdominal Endurance: Well Below Average   Lower body Flexibiltiy: Good       Recommended fitness guidelines:    -150 minutes of moderate intensity aerobic exercise per week or 75 minutes of vigorous intensity  aerobic exercise per week.   Try to walk for 30 minutes, 5 days a week in minimum bouts of 10 minutes at a time.  Incorporate some interval training to increase your heart rate for short periods of time.    -2 to 4 days per week of resistance training for each muscle group.  Practice daily pushups.    -Daily stretching with a hold of at least 30 seconds per muscle group.  Practice the seated hamstring stretch, demonstrated during the evaluation, daily.

## 2024-12-12 NOTE — PROGRESS NOTES
"Nutrition Assessment  Session Time:  45 minutes      Client name:  Eriberto Palomino  :  1980  Age:  43 y.o.  Gender:  male    Client states:  Very pleasant patient here for his annual [facility] physical.  Participates in nutrition consults annually and actively working on improving lifestyle habits.   with two children, daughter (10 y/o) and son (10 y/o), both of whom are involved in extracurricular activities.  Works full time in addition to managing the majority of household duties (cooking, laundry, cleaning, etc.).  Spends time in the evenings and on weekends traveling to children's sporting events and so, considers time to be a barrier to exercise.  Does not currently follow a formal exercise routine although realizes its benefit.  Discussed with MD possibility of incorporating exercise in early AM when he awakens prior to work.  Completes household chores in the early AM.  Over the past year, incorporated dietary modifications, including but not limited to whole grain substitution, switch to lite beer and reduced ETOH intake overall, increased F/V intake, improved portion control, and improved dietary mindfulness.  Finds that he is not eating as much as in the past.  As a result of such, fitness consult revealed 15.9# weight loss, 1.1% body fat reduction, 5 cm WC reduction, and RBP reduction!  In addition, HDL and TGL values also improved!  Expresses concern, however, regarding elevated HbA1c as he desires to reduce DM risk.  Denies FH of DM.  Overall, wishes to continue losing weight, begin a P.A. routine, and reduce HbA1c/DM risk.    Anthropometrics  Height:  5' 9"     Weight:  229.6#  BMI:  33.9  % Body Fat:  32.5%    Clinical Signs/Symptoms  N/V/D:  None  Appetite:  good       Past Medical History:   Diagnosis Date    Known health problems: none        Past Surgical History:   Procedure Laterality Date    VASECTOMY         Medications    currently has no medications in their medication " list.    Vitamins, Minerals, and/or Supplements:  None     Food/Medication Interactions:  Reviewed     Food Allergies or Intolerances:  NKFA     Social History    Marital status:    Employment:  [employer]    Social History     Tobacco Use    Smoking status: Former     Current packs/day: 0.00     Average packs/day: 1 pack/day for 5.0 years (5.0 ttl pk-yrs)     Types: Cigarettes     Start date: 1999     Quit date: 2004     Years since quittin.6     Passive exposure: Past    Smokeless tobacco: Never   Substance Use Topics    Alcohol use: Yes     Alcohol/week: 8.0 standard drinks of alcohol     Types: 8 Cans of beer per week        Lab Reports   Sodium   Date Value Ref Range Status   2024 140 136 - 145 mmol/L Final     Potassium   Date Value Ref Range Status   2024 4.4 3.5 - 5.1 mmol/L Final     Chloride   Date Value Ref Range Status   2024 107 95 - 110 mmol/L Final     CO2   Date Value Ref Range Status   2024 26 23 - 29 mmol/L Final     Glucose   Date Value Ref Range Status   2024 121 (H) 70 - 110 mg/dL Final     BUN   Date Value Ref Range Status   2024 23 (H) 6 - 20 mg/dL Final     Creatinine   Date Value Ref Range Status   2024 1.1 0.5 - 1.4 mg/dL Final     Calcium   Date Value Ref Range Status   2024 9.3 8.7 - 10.5 mg/dL Final     Total Protein   Date Value Ref Range Status   2024 7.5 6.0 - 8.4 g/dL Final     Albumin   Date Value Ref Range Status   2024 4.4 3.5 - 5.2 g/dL Final     Total Bilirubin   Date Value Ref Range Status   2024 0.7 0.1 - 1.0 mg/dL Final     Comment:     For infants and newborns, interpretation of results should be based  on gestational age, weight and in agreement with clinical  observations.    Premature Infant recommended reference ranges:  Up to 24 hours.............<8.0 mg/dL  Up to 48 hours............<12.0 mg/dL  3-5 days..................<15.0 mg/dL  6-29 days.................<15.0 mg/dL        Alkaline Phosphatase   Date Value Ref Range Status   12/12/2024 55 40 - 150 U/L Final     AST   Date Value Ref Range Status   12/12/2024 13 10 - 40 U/L Final     ALT   Date Value Ref Range Status   12/12/2024 19 10 - 44 U/L Final     Anion Gap   Date Value Ref Range Status   12/12/2024 7 (L) 8 - 16 mmol/L Final     eGFR if    Date Value Ref Range Status   11/19/2019 >60.0 >60 mL/min/1.73 m^2 Final     eGFR if non    Date Value Ref Range Status   11/19/2019 >60.0 >60 mL/min/1.73 m^2 Final     Comment:     Calculation used to obtain the estimated glomerular filtration  rate (eGFR) is the CKD-EPI equation.         Lab Results   Component Value Date    WBC 5.80 12/12/2024    HGB 15.6 12/12/2024    HCT 48.2 12/12/2024    MCV 92 12/12/2024     12/12/2024        Lab Results   Component Value Date    CHOL 187 12/12/2024     Lab Results   Component Value Date    HDL 68 12/12/2024     Lab Results   Component Value Date    LDLCALC 106.8 12/12/2024     Lab Results   Component Value Date    TRIG 61 12/12/2024     Lab Results   Component Value Date    CHOLHDL 36.4 12/12/2024     Lab Results   Component Value Date    HGBA1C 5.7 (H) 12/12/2024     BP Readings from Last 1 Encounters:   07/18/24 (!) 134/95       Food History  Breakfast:  Banana or apple + instant oatmeal or lite yogurt/boiled eggs  Mid-morning Snack:  +/- dried fruit and mixed nuts  Lunch:  Ham and roast beef sandwich on wheat  Mid-afternoon Snack:  None  Dinner:  Boiled seafood + sausage + corn + potatoes  Snack:  None  *Fluid intake:  Coffee, water, ETOH (Bueno Lite)    Exercise History:  No formal exercise routine    Cultural/Spiritual/Personal Preferences:  None identified    Support System:  spouse, children, and friends    State of Change:  Action    Barriers to Change:  Time constraints    Diagnosis    Altered nutrition related laboratory values related to inadequate physical activity as evidenced by HbA1c:   5.7%.    Overweight related to inadequate physical activity as evidenced by BMI:  33.9.    Intervention    RMR (Method:  InBody):  1889 kcal  Activity Factor:  1.3    ROWENA:  2455 - 500 = 1955 kcal    Goals:  1.  HbA1c < 5.7%  2.  Achieve 5% weight loss (11-12#)  3.  Begin exercise routine, developing attainable goal  4.  Delegate home tasks to spouse and children  5.  Continue efforts toward portion control and improved food choices    Nutrition Education  The following education was provided to the patient:  Complimented patient on proactive role in health maintenance.  Complimented patient on dietary compliance/modifications and resulting health improvements.  Discussed weight management.  Discussed macronutrient distribution among meals and snacks, including CHO, protein, and fat recommendations and its importance/benefits.  Discussed physical activity guidelines and its associated benefits.  Discussed nutrition-related lab values and dietary and/or lifestyle factors affecting them.  Discussed recommended protein intake (may include but not limited to recommended food sources, benefits of adequate protein intake, importance of protein distribution, etc.)   Discussed alcohol consumption (potential health consequences of excessive alcohol intake, recommended intake for men and women, recommended serving sizes, ways to reduce and/or moderate intake, etc.).  Discussed recommended servings of fruit/day and food sources of such.  Discussed recommended servings of non-starchy vegetables/day and food sources of such.  Discussed recommended fiber intake and food sources of such.  Discussed importance of small behavior/habit changes in improving long-term adherence and sustainability.  Discussed goal setting.  Provided ongoing support, encouragement, and guidance toward improved health efforts.    Patient verbalized understanding of nutrition education and recommendations received.    Handouts Provided  Meal Planning  Guide  Eat Fit Shopping List  Fueling Well On-The-Go    Monitoring/Evaluation    Monitor the following:  Weight  BMI  % Body Fat  Caloric intake  HbA1c    Follow Up Plan:  Communication with referring healthcare provider is unnecessary at this time as patient presented as part of annual wellness exam.  However, will follow up with patient in 1-2 years.

## 2024-12-18 ENCOUNTER — OFFICE VISIT (OUTPATIENT)
Dept: INTERNAL MEDICINE | Facility: CLINIC | Age: 44
End: 2024-12-18
Payer: COMMERCIAL

## 2024-12-18 VITALS
HEIGHT: 69 IN | WEIGHT: 229 LBS | HEART RATE: 72 BPM | OXYGEN SATURATION: 98 % | SYSTOLIC BLOOD PRESSURE: 126 MMHG | DIASTOLIC BLOOD PRESSURE: 88 MMHG | BODY MASS INDEX: 33.92 KG/M2

## 2024-12-18 DIAGNOSIS — E66.811 CLASS 1 OBESITY WITH BODY MASS INDEX (BMI) OF 33.0 TO 33.9 IN ADULT, UNSPECIFIED OBESITY TYPE, UNSPECIFIED WHETHER SERIOUS COMORBIDITY PRESENT: ICD-10-CM

## 2024-12-18 DIAGNOSIS — Z00.00 ENCOUNTER FOR SCREENING AND PREVENTATIVE CARE: Primary | ICD-10-CM

## 2024-12-18 PROCEDURE — 3079F DIAST BP 80-89 MM HG: CPT | Mod: CPTII,S$GLB,, | Performed by: EMERGENCY MEDICINE

## 2024-12-18 PROCEDURE — 3008F BODY MASS INDEX DOCD: CPT | Mod: CPTII,S$GLB,, | Performed by: EMERGENCY MEDICINE

## 2024-12-18 PROCEDURE — 3044F HG A1C LEVEL LT 7.0%: CPT | Mod: CPTII,S$GLB,, | Performed by: EMERGENCY MEDICINE

## 2024-12-18 PROCEDURE — 3074F SYST BP LT 130 MM HG: CPT | Mod: CPTII,S$GLB,, | Performed by: EMERGENCY MEDICINE

## 2024-12-18 PROCEDURE — 99999 PR PBB SHADOW E&M-EST. PATIENT-LVL III: CPT | Mod: PBBFAC,,, | Performed by: EMERGENCY MEDICINE

## 2024-12-18 PROCEDURE — 99396 PREV VISIT EST AGE 40-64: CPT | Mod: S$GLB,,, | Performed by: EMERGENCY MEDICINE

## 2024-12-18 NOTE — PROGRESS NOTES
[facility]      TODAY'S Date 12/18/2024  Patient ID: Eriberto Palomino is a 44 y.o. male   MRN: [id number]  Primary Care Physician (PCP):  Naveen Gonzalez MD    SUBJECTIVE         Chief Complaint:   Chief Complaint   Patient presents with    [facility]     Was here last week, had work ups.      HPI:   Reviewed medical, surgical, social and family history, medications, appropriate preventive health screenings, as well as vaccination history. Updates as noted below or in assessment and plan.    This is a very pleasant 44 y.o.  male who is presenting for his usual annual exam in [facility]. The patient is currently in good health, with the exception of  blood pressure and pre-diabetes concerns.    BLOOD PRESSURE:  He reports home blood pressure of 124/88 last Wednesday. He has a family history of hypertension.    PRE-DIABETES AND LABS:  Hemoglobin A1c has increased to 5.7 from previous value of 5.6, now in pre-diabetic range. Cholesterol is stable with optimal HDL and improved triglycerides from last year. BUN is elevated, likely due to dehydration. Blood count and thyroid function are normal.    DIET AND EXERCISE:  He reports eating smaller portions and making healthier food choices, including substituting wheat bread for white bread. He enjoys raw cauliflower and wheat bread sandwiches. He has incorporated exercises including push-ups, crunches, jumping jacks, and body squats, resulting in a 16-pound weight loss since last year.    OCULAR HISTORY:  He has been wearing contact lenses since age 18 and has been informed of having a thick lens or cornea. A review of medical records indicates patient has seen Optometry - Starla Ibrahim.    MENTAL HEALTH:  He reports good mood with improved work-related stress compared to previous levels.    FAMILY HISTORY:  He has two children, ages 10 and 11.      ROS:  General: -fever, -chills, -fatigue, -weight gain, -weight  "loss  Eyes: -vision changes, -redness, -discharge  ENT: -ear pain, -nasal congestion, -sore throat  Cardiovascular: -chest pain, -palpitations, -lower extremity edema  Respiratory: -cough, -shortness of breath  Gastrointestinal: -abdominal pain, -nausea, -vomiting, -diarrhea, -constipation, -blood in stool  Genitourinary: -dysuria, -hematuria, -frequency  Musculoskeletal: -joint pain, -muscle pain  Skin: -rash, -lesion  Neurological: -headache, -dizziness, -numbness, -tingling  Psychiatric: -anxiety, -depression, -sleep difficulty, -mood swings               Immunization History   Administered Date(s) Administered    Influenza - Quadrivalent - PF *Preferred* (6 months and older) 2019, 2023    Tdap 2023     Past Medical History:   Diagnosis Date    Known health problems: none      Past Surgical History:   Procedure Laterality Date    VASECTOMY       Family History   Problem Relation Name Age of Onset    Hypertension Father      Emphysema Paternal Grandmother       Social History     Tobacco Use    Smoking status: Former     Current packs/day: 0.00     Average packs/day: 1 pack/day for 5.0 years (5.0 ttl pk-yrs)     Types: Cigarettes     Start date: 1999     Quit date: 2004     Years since quittin.6     Passive exposure: Past    Smokeless tobacco: Never   Substance Use Topics    Alcohol use: Yes     Alcohol/week: 8.0 standard drinks of alcohol     Types: 8 Cans of beer per week    Drug use: No     Past Medical, Surgical and Social history reviewed and verified by me.     Review of patient's allergies indicates:  No Known Allergies  No current outpatient medications on file prior to visit.     No current facility-administered medications on file prior to visit.       OBJECTIVE     PHYSICAL EXAM  Vitals:    24 0858   BP: 126/88   Patient Position: Sitting   Pulse: 72   SpO2: 98%   Weight: 103.9 kg (229 lb)   Height: 5' 9" (1.753 m)     Vital Signs (Most Recent):  Pulse: 72 (24 " 0858)  BP: 126/88 (12/18/24 0858)  SpO2: 98 % (12/18/24 0858)   Weight:   Wt Readings from Last 1 Encounters:   12/18/24 103.9 kg (229 lb)     Body mass index is 33.82 kg/m².    Vital signs and nursing assessment noted: relatively normal vitals    GEN:   NAD, A & Ox3, atraumatic, well appearing, nontoxic appearing  HEENT:  PERRLA, EOMI, moist membranes, nl conjunctiva, no scleral icterus, no nystagmus, no nodes/nodules, soft, supple, FROM, no trachial deviation, nexus negative, normal pharynx, normal TMs bilaterally  CV:   2+ radial pulses, <2sec cap refill, no obvious JVD  RESP:  No obvious wheezing or stridor, equal and bilateral chest rise, no respiratory distress  ABD:   soft, Nontender, Nondistended,  no guarding/rebound  :   Deferred  BACK:  FROM, no midline tenderness, no paraspinal tenderness  EXT:   FROM, MARCELO x 4, no swelling, no edema, no calf tenderness, no bony tenderness, no warmth or redness, no crepitus, no obvious deformity  LYMPH:  no gross adenopathy  NEURO:  GCS 15, CN II-XII grossly intact, no obvious motor/sensory deficit, no tremor, negative Romberg, nl gait/coordination  PSYCH:   Cooperative, no SI/HI, no anxiety, nl mood/affect, nl judgement/thought process  SKIN:  no rashes/lesions or masses, nl color, no pallor, warm, dry, intact      Tests      Results for orders placed or performed during the hospital encounter of 12/12/24   EKG 12-lead    Collection Time: 12/12/24  9:59 AM   Result Value Ref Range    QRS Duration 72 ms    [facility] QTC Calculation 420 ms    Narrative    Test Reason : Z00.00,    Vent. Rate :  77 BPM     Atrial Rate :  77 BPM     P-R Int : 186 ms          QRS Dur :  72 ms      QT Int : 372 ms       P-R-T Axes :  45  -8  30 degrees    QTcB Int : 420 ms    Normal sinus rhythm  Normal ECG  When compared with ECG of 14-Dec-2023 08:54,  No significant change was found  Confirmed by Agustín Katz ([id number]) on 12/12/2024 10:15:05 AM    Referred By: KG HILL            Confirmed By: Agustín Katz      Labs reviewed and independently interpreted. Imaging studies reviewed.   Recent Results (from the past 12 weeks)   Comprehensive metabolic panel    Collection Time: 12/12/24  7:29 AM   Result Value Ref Range    Sodium 140 136 - 145 mmol/L    Potassium 4.4 3.5 - 5.1 mmol/L    Chloride 107 95 - 110 mmol/L    CO2 26 23 - 29 mmol/L    Glucose 121 (H) 70 - 110 mg/dL    BUN 23 (H) 6 - 20 mg/dL    Creatinine 1.1 0.5 - 1.4 mg/dL    Calcium 9.3 8.7 - 10.5 mg/dL    Total Protein 7.5 6.0 - 8.4 g/dL    Albumin 4.4 3.5 - 5.2 g/dL    Total Bilirubin 0.7 0.1 - 1.0 mg/dL    Alkaline Phosphatase 55 40 - 150 U/L    AST 13 10 - 40 U/L    ALT 19 10 - 44 U/L    eGFR >60.0 >60 mL/min/1.73 m^2    Anion Gap 7 (L) 8 - 16 mmol/L   CBC Without Differential    Collection Time: 12/12/24  7:29 AM   Result Value Ref Range    WBC 5.80 3.90 - 12.70 K/uL    RBC 5.26 4.60 - 6.20 M/uL    Hemoglobin 15.6 14.0 - 18.0 g/dL    Hematocrit 48.2 40.0 - 54.0 %    MCV 92 82 - 98 fL    MCH 29.7 27.0 - 31.0 pg    MCHC 32.4 32.0 - 36.0 g/dL    RDW 11.8 11.5 - 14.5 %    Platelets 226 150 - 450 K/uL    MPV 10.7 9.2 - 12.9 fL   Lipid panel    Collection Time: 12/12/24  7:29 AM   Result Value Ref Range    Cholesterol 187 120 - 199 mg/dL    Triglycerides 61 30 - 150 mg/dL    HDL 68 40 - 75 mg/dL    LDL Cholesterol 106.8 63.0 - 159.0 mg/dL    HDL/Cholesterol Ratio 36.4 20.0 - 50.0 %    Total Cholesterol/HDL Ratio 2.8 2.0 - 5.0    Non-HDL Cholesterol 119 mg/dL   TSH    Collection Time: 12/12/24  7:29 AM   Result Value Ref Range    TSH 1.626 0.400 - 4.000 uIU/mL   Hemoglobin A1c    Collection Time: 12/12/24  7:29 AM   Result Value Ref Range    Hemoglobin A1C 5.7 (H) 4.0 - 5.6 %    Estimated Avg Glucose 117 68 - 131 mg/dL   EKG 12-lead    Collection Time: 12/12/24  9:59 AM   Result Value Ref Range    QRS Duration 72 ms    [facility] QTC Calculation 420 ms      XR CHEST PA AND LATERAL Nov 2019 Impression:  No significant intrathoracic  abnormality.  No significant detrimental interval change in the appearance of the chest since 11/13/2018 is appreciated.      CT chest May 2014 Impression  In the posterior basal segment of the left lower lobe there is region of soft tissue consolidation with volume loss producing crowding of subsegmental airways.  The periphery of this abnormality has bandlike configuration.    We detect no abnormal vascular supply to this area on this limited noncontrast examination.    The area of consolidation and volume loss may represent scarring from remote insult, however in the absence of old films chronicity cannot be determined.  Clinical considerations will determine if there is need for contrast enhancement for assessment of vascular supply, CT surveillance over time of or histologic characterization.    ASSESSMENT/PLAN:   MDM  Reviewed: previous chart, nursing note and vitals  Reviewed previous: labs, ECG, x-ray and CT scan  Interpretation: labs (Elevated HgA1c and BUN otherwise relatively unremarkable CMP,  Lipid Panel, CBC, TSH)         [facility]to [facility]    Diagnoses and all orders for this visit:    Encounter for screening and preventative care    Class 1 obesity with body mass index (BMI) of 33.0 to 33.9 in adult, unspecified obesity type, unspecified whether serious comorbidity present     Assessment & Plan    IMPRESSION:  - Assessed patient's health status, noting improvement in weight loss and diet choices  - Evaluated blood pressure (124/88) and A1C (5.7), indicating pre-diabetic range  - Reviewed EKG results, confirming normal heart function with no evidence of chronic hypertension  - Determined current exercise regimen and lifestyle changes are appropriate for addressing hypertension and pre-diabetes concerns  - Performed physical exam, finding no significant abnormalities  - Analyzed recent lab results, noting normal blood count, electrolytes (except slightly elevated BUN likely  "due to dehydration), liver function, and thyroid function  - Noted improvement in cholesterol levels, with HDL above optimal range and lower triglycerides compared to previous year    - Emphasized the importance of consistency in exercise and lifestyle changes for long-term health improvements  - Discussed the concept of "smart" goal-setting for exercise plans, emphasizing specificity, measurability, attainability, relevance, and time-bound objectives  - Educated on the potential benefits of regular exercise in managing blood pressure and pre-diabetes  - Discussed the significance of maintaining a balanced diet and portion control for weight management    - Mr. Palomino to exercise at the gym 4 days per week at 5 a.m., starting tomorrow until February 15th  - Mr. Palomino to touch the gym door to maintain consistency if unable to work out on a given day  - Recommend adding weekend workouts when schedule permits  - Mr. Palomino to continue with current dietary habits, including consuming wheat bread, boiled eggs, oatmeal, fruit, greens, and raw cauliflower  - Mr. Palomino to maintain smaller portion sizes to support weight loss efforts  - Recommend visiting a dermatologist for evaluation of moles      The results of physical exam findings, labs, and imaging were reviewed with the patient. Management of above assessments/visit diagnoses was discussed with patient. Precautions for return discussed at length. Patient was given ample time for questions. All questions asked and answered to the satisfaction of the patient. Patient is in agreement with the above and verbalized understanding. Total time spent caring for the patient today was 30 minutes. This includes time spent before the visit reviewing the chart, time spent during the visit, and time spent after the visit on documentation.    Agatha Rahman MD  [facility]    Disclaimer: This document was created using voice recognition " software (M*Modal Fluency Direct). Although it may be edited, this document may contain errors related to incorrect recognition of the spoken word. Please contact the physician if clarification is needed.

## 2024-12-30 PROBLEM — E66.811 CLASS 1 OBESITY WITH BODY MASS INDEX (BMI) OF 33.0 TO 33.9 IN ADULT: Status: ACTIVE | Noted: 2023-12-14
